# Patient Record
Sex: MALE | Race: WHITE | Employment: PART TIME | URBAN - METROPOLITAN AREA
[De-identification: names, ages, dates, MRNs, and addresses within clinical notes are randomized per-mention and may not be internally consistent; named-entity substitution may affect disease eponyms.]

---

## 2019-11-07 ENCOUNTER — HOSPITAL ENCOUNTER (INPATIENT)
Facility: CLINIC | Age: 29
LOS: 4 days | Discharge: HOME | End: 2019-11-11
Attending: EMERGENCY MEDICINE | Admitting: HOSPITALIST
Payer: COMMERCIAL

## 2019-11-07 DIAGNOSIS — F10.930 ALCOHOL WITHDRAWAL SYNDROME WITHOUT COMPLICATION (H): ICD-10-CM

## 2019-11-07 PROBLEM — F10.939 ALCOHOL WITHDRAWAL (H): Status: ACTIVE | Noted: 2019-11-07

## 2019-11-07 LAB
ALBUMIN SERPL-MCNC: 4.4 G/DL (ref 3.4–5)
ALP SERPL-CCNC: 51 U/L (ref 40–150)
ALT SERPL W P-5'-P-CCNC: 128 U/L (ref 0–70)
ANION GAP SERPL CALCULATED.3IONS-SCNC: 11 MMOL/L (ref 3–14)
AST SERPL W P-5'-P-CCNC: 122 U/L (ref 0–45)
BILIRUB SERPL-MCNC: 1.2 MG/DL (ref 0.2–1.3)
BUN SERPL-MCNC: 8 MG/DL (ref 7–30)
CALCIUM SERPL-MCNC: 9.1 MG/DL (ref 8.5–10.1)
CHLORIDE SERPL-SCNC: 99 MMOL/L (ref 94–109)
CO2 SERPL-SCNC: 26 MMOL/L (ref 20–32)
CREAT SERPL-MCNC: 0.95 MG/DL (ref 0.66–1.25)
ERYTHROCYTE [DISTWIDTH] IN BLOOD BY AUTOMATED COUNT: 11.9 % (ref 10–15)
ETHANOL SERPL-MCNC: 0.04 G/DL
GFR SERPL CREATININE-BSD FRML MDRD: >90 ML/MIN/{1.73_M2}
GLUCOSE BLDC GLUCOMTR-MCNC: 104 MG/DL (ref 70–99)
GLUCOSE BLDC GLUCOMTR-MCNC: 94 MG/DL (ref 70–99)
GLUCOSE SERPL-MCNC: 104 MG/DL (ref 70–99)
HCT VFR BLD AUTO: 47.8 % (ref 40–53)
HGB BLD-MCNC: 16.6 G/DL (ref 13.3–17.7)
INR PPP: 0.95 (ref 0.86–1.14)
INTERPRETATION ECG - MUSE: NORMAL
MAGNESIUM SERPL-MCNC: 1.7 MG/DL (ref 1.6–2.3)
MCH RBC QN AUTO: 32.8 PG (ref 26.5–33)
MCHC RBC AUTO-ENTMCNC: 34.7 G/DL (ref 31.5–36.5)
MCV RBC AUTO: 95 FL (ref 78–100)
PLATELET # BLD AUTO: 180 10E9/L (ref 150–450)
POTASSIUM SERPL-SCNC: 3.6 MMOL/L (ref 3.4–5.3)
PROT SERPL-MCNC: 7.4 G/DL (ref 6.8–8.8)
RBC # BLD AUTO: 5.06 10E12/L (ref 4.4–5.9)
SODIUM SERPL-SCNC: 136 MMOL/L (ref 133–144)
WBC # BLD AUTO: 8.6 10E9/L (ref 4–11)

## 2019-11-07 PROCEDURE — 80320 DRUG SCREEN QUANTALCOHOLS: CPT | Performed by: EMERGENCY MEDICINE

## 2019-11-07 PROCEDURE — HZ2ZZZZ DETOXIFICATION SERVICES FOR SUBSTANCE ABUSE TREATMENT: ICD-10-PCS | Performed by: INTERNAL MEDICINE

## 2019-11-07 PROCEDURE — 20000003 ZZH R&B ICU

## 2019-11-07 PROCEDURE — 25000128 H RX IP 250 OP 636: Performed by: HOSPITALIST

## 2019-11-07 PROCEDURE — 96375 TX/PRO/DX INJ NEW DRUG ADDON: CPT

## 2019-11-07 PROCEDURE — 96361 HYDRATE IV INFUSION ADD-ON: CPT

## 2019-11-07 PROCEDURE — 87640 STAPH A DNA AMP PROBE: CPT | Performed by: HOSPITALIST

## 2019-11-07 PROCEDURE — 25000132 ZZH RX MED GY IP 250 OP 250 PS 637: Performed by: HOSPITALIST

## 2019-11-07 PROCEDURE — 25800030 ZZH RX IP 258 OP 636: Performed by: EMERGENCY MEDICINE

## 2019-11-07 PROCEDURE — 96376 TX/PRO/DX INJ SAME DRUG ADON: CPT

## 2019-11-07 PROCEDURE — 25800030 ZZH RX IP 258 OP 636: Performed by: HOSPITALIST

## 2019-11-07 PROCEDURE — 93005 ELECTROCARDIOGRAM TRACING: CPT

## 2019-11-07 PROCEDURE — 87641 MR-STAPH DNA AMP PROBE: CPT | Performed by: HOSPITALIST

## 2019-11-07 PROCEDURE — 99291 CRITICAL CARE FIRST HOUR: CPT | Performed by: HOSPITALIST

## 2019-11-07 PROCEDURE — 85610 PROTHROMBIN TIME: CPT | Performed by: EMERGENCY MEDICINE

## 2019-11-07 PROCEDURE — 25000132 ZZH RX MED GY IP 250 OP 250 PS 637: Performed by: PHYSICIAN ASSISTANT

## 2019-11-07 PROCEDURE — 85027 COMPLETE CBC AUTOMATED: CPT | Performed by: EMERGENCY MEDICINE

## 2019-11-07 PROCEDURE — 96374 THER/PROPH/DIAG INJ IV PUSH: CPT

## 2019-11-07 PROCEDURE — 99285 EMERGENCY DEPT VISIT HI MDM: CPT | Mod: 25

## 2019-11-07 PROCEDURE — 80053 COMPREHEN METABOLIC PANEL: CPT | Performed by: EMERGENCY MEDICINE

## 2019-11-07 PROCEDURE — 83735 ASSAY OF MAGNESIUM: CPT | Performed by: EMERGENCY MEDICINE

## 2019-11-07 PROCEDURE — 25000128 H RX IP 250 OP 636: Performed by: EMERGENCY MEDICINE

## 2019-11-07 PROCEDURE — 00000146 ZZHCL STATISTIC GLUCOSE BY METER IP

## 2019-11-07 PROCEDURE — 25000128 H RX IP 250 OP 636: Performed by: PHYSICIAN ASSISTANT

## 2019-11-07 PROCEDURE — 25000125 ZZHC RX 250: Performed by: HOSPITALIST

## 2019-11-07 RX ORDER — GABAPENTIN 400 MG/1
800 CAPSULE ORAL EVERY 8 HOURS
Status: COMPLETED | OUTPATIENT
Start: 2019-11-08 | End: 2019-11-10

## 2019-11-07 RX ORDER — LANOLIN ALCOHOL/MO/W.PET/CERES
3 CREAM (GRAM) TOPICAL
Status: DISCONTINUED | OUTPATIENT
Start: 2019-11-07 | End: 2019-11-11 | Stop reason: HOSPADM

## 2019-11-07 RX ORDER — NALOXONE HYDROCHLORIDE 0.4 MG/ML
.1-.4 INJECTION, SOLUTION INTRAMUSCULAR; INTRAVENOUS; SUBCUTANEOUS
Status: DISCONTINUED | OUTPATIENT
Start: 2019-11-07 | End: 2019-11-11 | Stop reason: HOSPADM

## 2019-11-07 RX ORDER — POTASSIUM CHLORIDE 1.5 G/1.58G
20-40 POWDER, FOR SOLUTION ORAL
Status: DISCONTINUED | OUTPATIENT
Start: 2019-11-07 | End: 2019-11-11 | Stop reason: HOSPADM

## 2019-11-07 RX ORDER — FOLIC ACID 5 MG/ML
1 INJECTION, SOLUTION INTRAMUSCULAR; INTRAVENOUS; SUBCUTANEOUS ONCE
Status: COMPLETED | OUTPATIENT
Start: 2019-11-07 | End: 2019-11-07

## 2019-11-07 RX ORDER — LORAZEPAM 1 MG/1
1-2 TABLET ORAL EVERY 30 MIN PRN
Status: DISCONTINUED | OUTPATIENT
Start: 2019-11-07 | End: 2019-11-07

## 2019-11-07 RX ORDER — DIAZEPAM 10 MG/2ML
20 INJECTION, SOLUTION INTRAMUSCULAR; INTRAVENOUS ONCE
Status: COMPLETED | OUTPATIENT
Start: 2019-11-07 | End: 2019-11-07

## 2019-11-07 RX ORDER — OLANZAPINE 5 MG/1
5 TABLET, ORALLY DISINTEGRATING ORAL EVERY 6 HOURS PRN
Status: CANCELLED | OUTPATIENT
Start: 2019-11-07

## 2019-11-07 RX ORDER — AMOXICILLIN 250 MG
2 CAPSULE ORAL 2 TIMES DAILY PRN
Status: DISCONTINUED | OUTPATIENT
Start: 2019-11-07 | End: 2019-11-11 | Stop reason: HOSPADM

## 2019-11-07 RX ORDER — METOPROLOL TARTRATE 1 MG/ML
5 INJECTION, SOLUTION INTRAVENOUS EVERY 6 HOURS PRN
Status: DISCONTINUED | OUTPATIENT
Start: 2019-11-07 | End: 2019-11-11 | Stop reason: HOSPADM

## 2019-11-07 RX ORDER — GABAPENTIN 300 MG/1
600 CAPSULE ORAL EVERY 8 HOURS
Status: DISCONTINUED | OUTPATIENT
Start: 2019-11-11 | End: 2019-11-11 | Stop reason: HOSPADM

## 2019-11-07 RX ORDER — DEXTROSE MONOHYDRATE, SODIUM CHLORIDE, AND POTASSIUM CHLORIDE 50; 1.49; 9 G/1000ML; G/1000ML; G/1000ML
INJECTION, SOLUTION INTRAVENOUS CONTINUOUS
Status: DISCONTINUED | OUTPATIENT
Start: 2019-11-07 | End: 2019-11-07

## 2019-11-07 RX ORDER — QUETIAPINE FUMARATE 25 MG/1
25-50 TABLET, FILM COATED ORAL EVERY 6 HOURS PRN
Status: DISCONTINUED | OUTPATIENT
Start: 2019-11-07 | End: 2019-11-07

## 2019-11-07 RX ORDER — DIAZEPAM 10 MG/2ML
10 INJECTION, SOLUTION INTRAMUSCULAR; INTRAVENOUS ONCE
Status: COMPLETED | OUTPATIENT
Start: 2019-11-07 | End: 2019-11-07

## 2019-11-07 RX ORDER — PROCHLORPERAZINE MALEATE 10 MG
10 TABLET ORAL EVERY 6 HOURS PRN
Status: DISCONTINUED | OUTPATIENT
Start: 2019-11-07 | End: 2019-11-11 | Stop reason: HOSPADM

## 2019-11-07 RX ORDER — POTASSIUM CHLORIDE 29.8 MG/ML
20 INJECTION INTRAVENOUS
Status: DISCONTINUED | OUTPATIENT
Start: 2019-11-07 | End: 2019-11-07

## 2019-11-07 RX ORDER — FOLIC ACID 1 MG/1
1 TABLET ORAL DAILY
Status: DISCONTINUED | OUTPATIENT
Start: 2019-11-07 | End: 2019-11-07

## 2019-11-07 RX ORDER — LIDOCAINE 40 MG/G
CREAM TOPICAL
Status: DISCONTINUED | OUTPATIENT
Start: 2019-11-07 | End: 2019-11-11 | Stop reason: HOSPADM

## 2019-11-07 RX ORDER — POTASSIUM CHLORIDE 7.45 MG/ML
10 INJECTION INTRAVENOUS
Status: DISCONTINUED | OUTPATIENT
Start: 2019-11-07 | End: 2019-11-11 | Stop reason: HOSPADM

## 2019-11-07 RX ORDER — MULTIPLE VITAMINS W/ MINERALS TAB 9MG-400MCG
1 TAB ORAL DAILY
Status: DISCONTINUED | OUTPATIENT
Start: 2019-11-07 | End: 2019-11-11 | Stop reason: HOSPADM

## 2019-11-07 RX ORDER — FOLIC ACID 1 MG/1
1 TABLET ORAL DAILY
Status: DISCONTINUED | OUTPATIENT
Start: 2019-11-10 | End: 2019-11-11 | Stop reason: HOSPADM

## 2019-11-07 RX ORDER — LANOLIN ALCOHOL/MO/W.PET/CERES
100 CREAM (GRAM) TOPICAL DAILY
Status: DISCONTINUED | OUTPATIENT
Start: 2019-11-07 | End: 2019-11-08

## 2019-11-07 RX ORDER — GABAPENTIN 600 MG/1
1200 TABLET ORAL ONCE
Status: COMPLETED | OUTPATIENT
Start: 2019-11-07 | End: 2019-11-07

## 2019-11-07 RX ORDER — GABAPENTIN 100 MG/1
100 CAPSULE ORAL EVERY 8 HOURS
Status: DISCONTINUED | OUTPATIENT
Start: 2019-11-15 | End: 2019-11-11 | Stop reason: HOSPADM

## 2019-11-07 RX ORDER — AMOXICILLIN 250 MG
1 CAPSULE ORAL 2 TIMES DAILY PRN
Status: DISCONTINUED | OUTPATIENT
Start: 2019-11-07 | End: 2019-11-11 | Stop reason: HOSPADM

## 2019-11-07 RX ORDER — LANOLIN ALCOHOL/MO/W.PET/CERES
100 CREAM (GRAM) TOPICAL 3 TIMES DAILY
Status: CANCELLED | OUTPATIENT
Start: 2019-11-09 | End: 2019-11-14

## 2019-11-07 RX ORDER — SODIUM CHLORIDE, SODIUM LACTATE, POTASSIUM CHLORIDE, CALCIUM CHLORIDE 600; 310; 30; 20 MG/100ML; MG/100ML; MG/100ML; MG/100ML
INJECTION, SOLUTION INTRAVENOUS CONTINUOUS
Status: DISCONTINUED | OUTPATIENT
Start: 2019-11-07 | End: 2019-11-09

## 2019-11-07 RX ORDER — LANOLIN ALCOHOL/MO/W.PET/CERES
100 CREAM (GRAM) TOPICAL DAILY
Status: DISCONTINUED | OUTPATIENT
Start: 2019-11-14 | End: 2019-11-08

## 2019-11-07 RX ORDER — CLONIDINE HYDROCHLORIDE 0.1 MG/1
0.1 TABLET ORAL EVERY 8 HOURS
Status: DISCONTINUED | OUTPATIENT
Start: 2019-11-07 | End: 2019-11-07

## 2019-11-07 RX ORDER — FOLIC ACID 5 MG/ML
1 INJECTION, SOLUTION INTRAMUSCULAR; INTRAVENOUS; SUBCUTANEOUS DAILY
Status: CANCELLED | OUTPATIENT
Start: 2019-11-08 | End: 2019-11-10

## 2019-11-07 RX ORDER — FAMOTIDINE 20 MG/1
20 TABLET, FILM COATED ORAL 2 TIMES DAILY
Status: CANCELLED | OUTPATIENT
Start: 2019-11-07

## 2019-11-07 RX ORDER — ONDANSETRON 4 MG/1
8 TABLET, ORALLY DISINTEGRATING ORAL EVERY 8 HOURS PRN
Status: DISCONTINUED | OUTPATIENT
Start: 2019-11-07 | End: 2019-11-11 | Stop reason: HOSPADM

## 2019-11-07 RX ORDER — DIAZEPAM 5 MG
10 TABLET ORAL EVERY 30 MIN PRN
Status: CANCELLED | OUTPATIENT
Start: 2019-11-07

## 2019-11-07 RX ORDER — LORAZEPAM 2 MG/ML
1-2 INJECTION INTRAMUSCULAR EVERY 30 MIN PRN
Status: DISCONTINUED | OUTPATIENT
Start: 2019-11-07 | End: 2019-11-07

## 2019-11-07 RX ORDER — PROCHLORPERAZINE 25 MG
25 SUPPOSITORY, RECTAL RECTAL EVERY 12 HOURS PRN
Status: DISCONTINUED | OUTPATIENT
Start: 2019-11-07 | End: 2019-11-11 | Stop reason: HOSPADM

## 2019-11-07 RX ORDER — METOPROLOL TARTRATE 1 MG/ML
5 INJECTION, SOLUTION INTRAVENOUS EVERY 6 HOURS PRN
Status: CANCELLED | OUTPATIENT
Start: 2019-11-07

## 2019-11-07 RX ORDER — PROPRANOLOL HYDROCHLORIDE 10 MG/1
10-20 TABLET ORAL 2 TIMES DAILY PRN
Status: CANCELLED | OUTPATIENT
Start: 2019-11-07

## 2019-11-07 RX ORDER — GABAPENTIN 300 MG/1
300 CAPSULE ORAL EVERY 8 HOURS
Status: DISCONTINUED | OUTPATIENT
Start: 2019-11-13 | End: 2019-11-11 | Stop reason: HOSPADM

## 2019-11-07 RX ORDER — BISACODYL 10 MG
10 SUPPOSITORY, RECTAL RECTAL DAILY PRN
Status: DISCONTINUED | OUTPATIENT
Start: 2019-11-07 | End: 2019-11-11 | Stop reason: HOSPADM

## 2019-11-07 RX ORDER — HALOPERIDOL 5 MG/ML
2.5-5 INJECTION INTRAMUSCULAR EVERY 4 HOURS PRN
Status: CANCELLED | OUTPATIENT
Start: 2019-11-07

## 2019-11-07 RX ORDER — DIAZEPAM 10 MG/2ML
5 INJECTION, SOLUTION INTRAMUSCULAR; INTRAVENOUS ONCE
Status: COMPLETED | OUTPATIENT
Start: 2019-11-07 | End: 2019-11-07

## 2019-11-07 RX ORDER — IBUPROFEN 600 MG/1
600 TABLET, FILM COATED ORAL EVERY 6 HOURS PRN
Status: DISCONTINUED | OUTPATIENT
Start: 2019-11-07 | End: 2019-11-09

## 2019-11-07 RX ORDER — DIAZEPAM 10 MG/2ML
INJECTION, SOLUTION INTRAMUSCULAR; INTRAVENOUS
Status: DISCONTINUED
Start: 2019-11-07 | End: 2019-11-07 | Stop reason: HOSPADM

## 2019-11-07 RX ORDER — DIAZEPAM 10 MG/2ML
20 INJECTION, SOLUTION INTRAMUSCULAR; INTRAVENOUS ONCE
Status: DISCONTINUED | OUTPATIENT
Start: 2019-11-07 | End: 2019-11-07

## 2019-11-07 RX ORDER — ONDANSETRON 2 MG/ML
8 INJECTION INTRAMUSCULAR; INTRAVENOUS ONCE
Status: COMPLETED | OUTPATIENT
Start: 2019-11-07 | End: 2019-11-07

## 2019-11-07 RX ORDER — POTASSIUM CHLORIDE 1500 MG/1
20-40 TABLET, EXTENDED RELEASE ORAL
Status: DISCONTINUED | OUTPATIENT
Start: 2019-11-07 | End: 2019-11-11 | Stop reason: HOSPADM

## 2019-11-07 RX ORDER — DIAZEPAM 10 MG/2ML
5-10 INJECTION, SOLUTION INTRAMUSCULAR; INTRAVENOUS EVERY 30 MIN PRN
Status: CANCELLED | OUTPATIENT
Start: 2019-11-07

## 2019-11-07 RX ORDER — ONDANSETRON 2 MG/ML
8 INJECTION INTRAMUSCULAR; INTRAVENOUS EVERY 8 HOURS PRN
Status: DISCONTINUED | OUTPATIENT
Start: 2019-11-07 | End: 2019-11-11 | Stop reason: HOSPADM

## 2019-11-07 RX ORDER — MAGNESIUM SULFATE HEPTAHYDRATE 40 MG/ML
4 INJECTION, SOLUTION INTRAVENOUS EVERY 4 HOURS PRN
Status: DISCONTINUED | OUTPATIENT
Start: 2019-11-07 | End: 2019-11-11 | Stop reason: HOSPADM

## 2019-11-07 RX ORDER — MULTIPLE VITAMINS W/ MINERALS TAB 9MG-400MCG
1 TAB ORAL DAILY
Status: DISCONTINUED | OUTPATIENT
Start: 2019-11-07 | End: 2019-11-07

## 2019-11-07 RX ORDER — POTASSIUM CL/LIDO/0.9 % NACL 10MEQ/0.1L
10 INTRAVENOUS SOLUTION, PIGGYBACK (ML) INTRAVENOUS
Status: DISCONTINUED | OUTPATIENT
Start: 2019-11-07 | End: 2019-11-11 | Stop reason: HOSPADM

## 2019-11-07 RX ADMIN — ENOXAPARIN SODIUM 40 MG: 40 INJECTION SUBCUTANEOUS at 17:00

## 2019-11-07 RX ADMIN — SODIUM CHLORIDE, POTASSIUM CHLORIDE, SODIUM LACTATE AND CALCIUM CHLORIDE: 600; 310; 30; 20 INJECTION, SOLUTION INTRAVENOUS at 16:53

## 2019-11-07 RX ADMIN — MELATONIN TAB 3 MG 3 MG: 3 TAB at 21:43

## 2019-11-07 RX ADMIN — DIAZEPAM 20 MG: 5 INJECTION, SOLUTION INTRAMUSCULAR; INTRAVENOUS at 14:55

## 2019-11-07 RX ADMIN — GABAPENTIN 800 MG: 400 CAPSULE ORAL at 23:53

## 2019-11-07 RX ADMIN — DIAZEPAM 10 MG: 5 INJECTION, SOLUTION INTRAMUSCULAR; INTRAVENOUS at 12:25

## 2019-11-07 RX ADMIN — DIAZEPAM 10 MG: 5 INJECTION, SOLUTION INTRAMUSCULAR; INTRAVENOUS at 14:20

## 2019-11-07 RX ADMIN — SODIUM CHLORIDE, POTASSIUM CHLORIDE, SODIUM LACTATE AND CALCIUM CHLORIDE 1000 ML: 600; 310; 30; 20 INJECTION, SOLUTION INTRAVENOUS at 12:06

## 2019-11-07 RX ADMIN — THIAMINE HYDROCHLORIDE 200 MG: 100 INJECTION, SOLUTION INTRAMUSCULAR; INTRAVENOUS at 18:02

## 2019-11-07 RX ADMIN — DEXMEDETOMIDINE 0.2 MCG/KG/HR: 100 INJECTION, SOLUTION, CONCENTRATE INTRAVENOUS at 16:48

## 2019-11-07 RX ADMIN — DIAZEPAM 10 MG: 5 INJECTION, SOLUTION INTRAMUSCULAR; INTRAVENOUS at 13:03

## 2019-11-07 RX ADMIN — FOLIC ACID 1 MG: 5 INJECTION, SOLUTION INTRAMUSCULAR; INTRAVENOUS; SUBCUTANEOUS at 18:02

## 2019-11-07 RX ADMIN — POTASSIUM CHLORIDE 20 MEQ: 1500 TABLET, EXTENDED RELEASE ORAL at 17:42

## 2019-11-07 RX ADMIN — THIAMINE HYDROCHLORIDE 200 MG: 100 INJECTION, SOLUTION INTRAMUSCULAR; INTRAVENOUS at 21:45

## 2019-11-07 RX ADMIN — MULTIPLE VITAMINS W/ MINERALS TAB 1 TABLET: TAB at 16:58

## 2019-11-07 RX ADMIN — ONDANSETRON HYDROCHLORIDE 8 MG: 2 INJECTION, SOLUTION INTRAMUSCULAR; INTRAVENOUS at 12:20

## 2019-11-07 RX ADMIN — DIAZEPAM 5 MG: 5 INJECTION, SOLUTION INTRAMUSCULAR; INTRAVENOUS at 12:06

## 2019-11-07 RX ADMIN — GABAPENTIN 1200 MG: 600 TABLET, FILM COATED ORAL at 16:58

## 2019-11-07 RX ADMIN — Medication 100 MG: at 16:58

## 2019-11-07 ASSESSMENT — ENCOUNTER SYMPTOMS
DIARRHEA: 0
FEVER: 0
HALLUCINATIONS: 0
HEADACHES: 0
NAUSEA: 1
VOMITING: 0
CONFUSION: 0
SHORTNESS OF BREATH: 0
NERVOUS/ANXIOUS: 1
TREMORS: 1
ABDOMINAL PAIN: 0
SEIZURES: 1
BLOOD IN STOOL: 0

## 2019-11-07 ASSESSMENT — ACTIVITIES OF DAILY LIVING (ADL)
FALL_HISTORY_WITHIN_LAST_SIX_MONTHS: NO
RETIRED_COMMUNICATION: 0-->UNDERSTANDS/COMMUNICATES WITHOUT DIFFICULTY
ADLS_ACUITY_SCORE: 11
DRESS: 0-->INDEPENDENT
COGNITION: 0 - NO COGNITION ISSUES REPORTED
RETIRED_EATING: 0-->INDEPENDENT
AMBULATION: 0-->INDEPENDENT
TOILETING: 0-->INDEPENDENT
BATHING: 0-->INDEPENDENT
SWALLOWING: 0-->SWALLOWS FOODS/LIQUIDS WITHOUT DIFFICULTY
TRANSFERRING: 0-->INDEPENDENT

## 2019-11-07 ASSESSMENT — MIFFLIN-ST. JEOR: SCORE: 2019.75

## 2019-11-07 NOTE — PHARMACY-ADMISSION MEDICATION HISTORY
Admission medication history interview status for this patient is complete. See [facility] admission navigator for allergy information, prior to admission medications and immunization status.     Medication history interview source(s):Patient  Medication history resources (including written lists, pill bottles, clinic record):None  [facility]    Changes made to PTA medication list:  Added: none  Deleted: none  Changed: none    Actions taken by pharmacist (provider contacted, etc):None     Additional medication history information:None    Medication reconciliation/reorder completed by provider prior to medication history?  No     Do you take OTC medications (eg tylenol, ibuprofen, fish oil, eye/ear drops, etc)? No     For patients on insulin therapy: No      Prior to Admission medications    Not on File

## 2019-11-07 NOTE — ED NOTES
[facility]  ED Nurse Handoff Report    Lanre Garcia is a 29 year old male   ED Chief complaint: No chief complaint on file.  . ED Diagnosis:   Final diagnoses:   Alcohol withdrawal syndrome without complication (H)     Allergies: No Known Allergies    Code Status: Full Code  Activity level - Baseline/Home:  Independent. Activity Level - Current:   Assist X 1. Lift room needed: No. Bariatric: No   Needed: No   Isolation: No. Infection: Not Applicable.     Vital Signs:   Vitals:    11/07/19 1150 11/07/19 1200   BP: (!) 186/113 (!) 151/114   Pulse: 103 97   Resp: 24 17   Temp: 98  F (36.7  C)    TempSrc: Oral    SpO2: 100% 99%       Cardiac Rhythm:  ,      Pain level:    Patient confused: No. Patient Falls Risk: Yes.   Elimination Status: has not voided yet   Patient Report / Focused Assessment:    Tests Performed / Abnormal Results:    No orders to display     Labs Ordered and Resulted from Time of ED Arrival Up to the Time of Departure from the ED   COMPREHENSIVE METABOLIC PANEL - Abnormal; Notable for the following components:       Result Value    Glucose 104 (*)      (*)      (*)     All other components within normal limits   ALCOHOL ETHYL - Abnormal; Notable for the following components:    Ethanol g/dL 0.04 (*)     All other components within normal limits   GLUCOSE BY METER - Abnormal; Notable for the following components:    Glucose 104 (*)     All other components within normal limits   CBC WITH PLATELETS   INR   MAGNESIUM   PERIPHERAL IV CATHETER   CARDIAC CONTINUOUS MONITORING   Treatments provided: PIV, LABS, BLOOD, EKG, VALIUM, ZOFRAN, BOX LUNCH, CARDIAC, BP AND PULSE MONITORING, CIWA.  Family Comments: dad has left  OBS brochure/video discussed/provided to patient:  No  ED Medications:   Medications   diazepam (VALIUM) 5 MG/ML injection (  Not Given 11/7/19 1227)   lactated ringers BOLUS 1,000 mL (1,000 mLs Intravenous New Bag 11/7/19 1206)   diazepam  (VALIUM) injection 5 mg (5 mg Intravenous Given 11/7/19 1206)   ondansetron (ZOFRAN) injection 8 mg (8 mg Intravenous Given 11/7/19 1220)   diazepam (VALIUM) injection 10 mg (10 mg Intravenous Given 11/7/19 1225)   diazepam (VALIUM) injection 10 mg (10 mg Intravenous Given 11/7/19 1303)     Drips infusing:  Yes  For the majority of the shift, the patient's behavior Green. Interventions performed were none.     Severe Sepsis OR Septic Shock Diagnosis Present: No      ED Nurse Name/Phone Number: Le Warren RN, [phone]  1:39 PM

## 2019-11-07 NOTE — H&P
"[facility]    History and Physical  Hospitalist       Date of Admission:  11/7/2019    Assessment & Plan   Lanre Garcia is a 29 year old male history of anxiety/depression and history of complicated alcohol withdrawal in the past who presents with alcohol withdrawal symptoms.    #Alcohol withdrawal with history of withdrawal seizures: Patient also reportedly with an unwitnessed seizure earlier this a.m.  Lasted only seconds and described as clenching his jaw in his entire body \"went stiff.\"  Patient does appear tremulous and anxious in the ED.  He was given a total of 30 mg of diazepam with some improvement in symptoms.  His last drink was approximately 2300 on 11/6.  He typically drinks about 600 cc of hard liquor daily.  He is interested in cessation.  -Admit to ICU for close monitoring in the setting of possible complicated withdrawal.  -Alcohol withdrawal ICU protocol ordered with gabapentin and Precedex drip.  He is already received a total of 30 mg of IV Diazepam in the ED. Given signficant tremulousness despite administration of diazepam, will start precedex gtt along with gabapentin therapy.  Discussed with on call intensivist.    -IV fluids  -IV vitamins followed by daily vitamin therapy  -Social work consult once patient clinically improved    DVT Prophylaxis: Enoxaparin (Lovenox) SQ  Code Status: Full Code  Expected discharge: 2 - 3 days, recommended to prior living arrangement once and out of the window for complicated withdrawal.  Patient may transfer to the floor pending how he does overnight and his need for Precedex.    Greater than 40 minutes of critical care time spent taking care of this patient.  Patient was discussed with on-call intensivist.    Khai Luciano MD    Primary Care Physician   [facility]    Chief Complaint   Alcohol withdrawal    History is obtained from the patient, his chart, emergency medicine physician.    History of Present Illness   Lanre PEREZ" Jose is a 29 year old male history of anxiety/depression and history of complicated alcohol withdrawal in the past who presents with alcohol withdrawal symptoms.    Patient states he has attempted to stop drinking multiple times over the last several years.  Each time he typically has withdrawal symptoms.  Usually 24 hours after stopping drinking he will get tremulous and has had seizures in the past.  He states some of the seizures have been witnessed.  He has not ever been hospitalized for alcohol withdrawal in the past.  He is also not been under outpatient management for alcohol withdrawal in the past.  He states his last drink was approximately 12 hours ago.  He typically drinks about 600 cc of hard liquor daily.  He states that starting this morning he started to have some auditory hallucinations where he felt like he was hearing some voices.  He also notes that he was getting increasingly tremulous.  He also states that he had a seizure that lasted a few seconds.  He states that he clenched up in his mouth was clenched tight and he could not open it.  He did not bite his tongue and he did not lose bowel or bladder function.    In the ED, patient noted to be tachycardic, hypertensive and tremulous.  Labs notable for normal BMP and mild elevation of liver enzymes.  CBC was within normal limits.  INR was within normal limits.  Ethanol level was 0.04.  Patient was given a total of 30 mg of diazepam in the ED.  This did help with his symptoms but he did still continue to feel anxious.  As such ICU admission was recommended for Precedex drip.    Past Medical History    I have reviewed this patient's medical history and updated it with pertinent information if needed.   Past Medical History:   Diagnosis Date     ETOHism (H)      Seizures (H)        Past Surgical History   I have reviewed this patient's surgical history and updated it with pertinent information if needed.  No past surgical history on file.    Prior  to Admission Medications    None  Allergies   No known Allergies    Social History   Patient lives independently.  He is a non-smoker.  Drinks as above in HPI.    Family History   Positive family history of alcohol use on mother side.    Review of Systems   The 10 point Review of Systems is negative other than noted in the HPI or here.     Physical Exam   Temp: 98  F (36.7  C) Temp src: Oral BP: (!) 141/104 Pulse: 103 Heart Rate: 112 Resp: 11 SpO2: 99 % O2 Device: None (Room air)    Vital Signs with Ranges  Temp:  [98  F (36.7  C)] 98  F (36.7  C)  Pulse:  [] 103  Heart Rate:  [] 112  Resp:  [11-26] 11  BP: (141-186)/(102-119) 141/104  SpO2:  [95 %-100 %] 99 %  0 lbs 0 oz    Constitutional: Appears anxious, sitting up in bed.  tremulous  HEENT: Mucous membranes moist without evidence of tongue lesion.  normocephalic  Respiratory: Clear bilaterally, normal work of breathing  Cardiovascular: Tacky without murmur  GI: Bowel sounds present, nontender nondistended  Lymph/Hematologic: No Bruising noted  Skin: No rashes noted  Musculoskeletal: Nl tone  Neurologic: Cranial nerves II through XII intact, patient answers questions appropriately.  ANO x3.  Moves all extremities.  Positive tremor in the upper extremities bilaterally noted.  Psychiatric: Anxious    Data   Data reviewed today:  I personally reviewed     EKG showing sinus rhythm without acute ST segment elevations or depressions.    Recent Labs   Lab 11/07/19  1209   WBC 8.6   HGB 16.6   MCV 95      INR 0.95      POTASSIUM 3.6   CHLORIDE 99   CO2 26   BUN 8   CR 0.95   ANIONGAP 11   MAYNOR 9.1   *   ALBUMIN 4.4   PROTTOTAL 7.4   BILITOTAL 1.2   ALKPHOS 51   *   *       No results found for this or any previous visit (from the past 24 hour(s)).

## 2019-11-07 NOTE — ED TRIAGE NOTES
"ABCs intact. Pt hx ETOH withdrawal with seizures. Pt states he drinks about 600ml 100 proof schnapps every day. Last drink about twelve hrs ago. Pt states he \"had a seizure this morning, teeth chattering.\" Pt states he did not lose consciousness or incontinence.   "

## 2019-11-07 NOTE — ED PROVIDER NOTES
History     Chief Complaint:  Alcohol withdrawal     HPI  Lanre Garcia is a 29 year old male who presents to the emergency department today for evaluation of alcohol withdrawal. The patient reports that this morning, he was asleep and was woken up for a brief moment before seeing flashing lights and chattering teeth and feeling tensed up. He believes this lasted a total of 10 seconds and doesn't think he lost consciousness. No bowel or bladder incontinence. He believes it was a seizure and after the episode, he felt very nauseous. He is still nauseous here and has tremors. The patient reports 4-5 episodes of alcohol withdrawal. He last had 3 seizures in a 5 minute period during his last episode of withdrawal, though states he was at home and wasn't evaluated in the emergency department at that time. His last drink was 12 hours ago, though he states it typically takes over 24 hours for him to go into withdrawal. He typically drinks a total of 600 mL daily of 100 proof schnapps. No recent head trauma or falls. No fevers, black or bloody stools, vomiting, diarrhea, abdominal pain, headache, chest pain, shortness of breath, hallucinations, confusion, or any other symptoms. No suicidal or homicidal ideation.    Allergies:  No known drug allergies    Medications:    The patient is not currently taking any prescribed medications.    Past Medical History:    Alcoholism   Seizures  Major depressive disorder    Past Surgical History:    History reviewed. No pertinent surgical history.    Family History:    History reviewed. No pertinent family history.     Social History:  Smoking status: Never  Alcohol use: Yes  Marital Status: Single     Review of Systems   Constitutional: Negative for fever.   Respiratory: Negative for shortness of breath.    Cardiovascular: Negative for chest pain.   Gastrointestinal: Positive for nausea. Negative for abdominal pain, blood in stool, diarrhea and vomiting.   Neurological: Positive  for tremors and seizures. Negative for syncope and headaches.   Psychiatric/Behavioral: Negative for confusion, hallucinations, self-injury and suicidal ideas. The patient is nervous/anxious.    All other systems reviewed and are negative.      Physical Exam     Patient Vitals for the past 24 hrs:   BP Temp Temp src Pulse Heart Rate Resp SpO2   11/07/19 1200 (!) 151/114 -- -- 97 96 17 99 %   11/07/19 1150 (!) 186/113 98  F (36.7  C) Oral 103 103 24 100 %     Physical Exam    General:   Pleasant, mildly ill appearing male  HEENT:    Oropharynx is moist, without lesions or trismus.     No tongue laceration  Eyes:    Conjunctiva normal  Neck:    Supple, no meningismus.     CV:     Tachycardic, regular hythm.      No murmurs, rubs or gallops.       2+ radial pulses bilateral.       No lower extremity edema.  PULM:    Clear to auscultation bilateral.       No respiratory distress.      Good air exchange.  ABD:    Soft, non-tender, non-distended.       No pulsatile masses.       No rebound, guarding or rigidity.  MSK:     No gross deformity to all four extremities.   LYMPH:   No cervical lymphadenopathy.  NEURO:   Alert & O x 3.     CN II-XII intact, speech is clear with no aphasia.       Strength is 5/5 in all 4 extremities.  Sensation is intact.       Normal muscular tone     Fine resting tremor to the upper extremities  Skin:    Diaphoretic  Psych:    Mildly anxious    Emergency Department Course     ECG:  ECG taken at 1152, ECG read at 1157  Normal sinus rhythm. Minimal voltage criteria for LVH, may be normal variant.  Borderline ECG  Rate 92 bpm. MD interval 154 ms. QRS duration 96 ms. QT/QTc 356/440 ms. P-R-T axes 38 15 42.    Laboratory:  Laboratory findings were communicated with the patient who voiced understanding of the findings.    CBC: WBC 8.6, HGB 16.6,   CMP: Glucose 104 (H) o/w WNL (Creatinine 0.95)  INR: 0.95  Alcohol ethyl: 0.04 (H)  Magnesium: 1.7  1148 - Glucose: 104 (H)    Interventions:  1206:  Lactated ringers 1,000mL IV Bolus  1206: Valium 5mg IV  1220: Zofran 8mg IV   1225: Valium 10mg IV  1303: Valium 10mg IV  1420: Valium 10mg IV  1455: Valium 20mg IV    Emergency Department Course:  Past medical records, nursing notes, and vitals reviewed.  1145: I performed an exam of the patient and obtained history, as documented above. GCS 15.    IV inserted and blood drawn.    1220: I rechecked the patient. Heart rate is 115. Tremor is still present.    1255: Resting tremor much improved although present. Blood pressure 140/100.    Findings and plan explained to the Patient who consents to admission.     1307: Discussed the patient with Berkley Dior PA-C, for Dr. Schumacher, who will admit the patient to a med tele bed for further monitoring, evaluation, and treatment.     1414: I rechecked the patient. He is very tachycardic at 125 and tremor has significantly increased.    1440: I rechecked the patient. Patient states he is feeling better but heart rate is 130 now and the tremor is persisting. Will upgrade him to an ICU bed rather than med tele.    1506: I rechecked the patient.     1508: I discussed the case with Berkley Dior again regarding the patient and my concerns for an ICU bed. He will be admitted to an ICU bed now.    Impression & Plan      Medical Decision Making:  Lanre Garcia is a 29 year old male presented to the ED with findings concerning for alcohol withdrawal.  He was concerned that he may have had a seizure but his historical features do not support this diagnosis.  He has obvious alcohol withdrawal symptoms with tremor, tachycardia and hypertension.  Basic laboratory studies are unremarkable outside alcohol induced hepatitis.  Patient had improvement with multiple rounds of benzodiazepines.  Patient did have significant recurrence of symptoms after 25 mg of Valium requiring an additional 30 mg.  Due to his significant benzodiazepine requirement, patient will be transferred to the  intensive care unit.  Recheck prior to transfer to the ICU, tremor is minimal and heart rate is 110.  Blood pressure is 140 systolic.  He is mentating well and does not require any respiratory support.  No indication for further emergent intervention prior to his ICU transfer.    Total critical care time: 30 minutes    Diagnosis:    ICD-10-CM   1. Alcohol withdrawal syndrome without complication (H) F10.230       Disposition:   Admitted.      Scribe Disclosure:  I, Gloria Terry, am serving as a scribe at 11:45 AM on 11/7/2019 to document services personally performed by Elia Chamorro MD based on my observations and the provider's statements to me.    [facility] EMERGENCY DEPARTMENT       Elia Chamorro MD  11/07/19 3950

## 2019-11-08 LAB
ANION GAP SERPL CALCULATED.3IONS-SCNC: 5 MMOL/L (ref 3–14)
BUN SERPL-MCNC: 9 MG/DL (ref 7–30)
CALCIUM SERPL-MCNC: 9.2 MG/DL (ref 8.5–10.1)
CHLORIDE SERPL-SCNC: 106 MMOL/L (ref 94–109)
CO2 SERPL-SCNC: 28 MMOL/L (ref 20–32)
CREAT SERPL-MCNC: 1.04 MG/DL (ref 0.66–1.25)
GFR SERPL CREATININE-BSD FRML MDRD: >90 ML/MIN/{1.73_M2}
GLUCOSE BLDC GLUCOMTR-MCNC: 82 MG/DL (ref 70–99)
GLUCOSE BLDC GLUCOMTR-MCNC: 87 MG/DL (ref 70–99)
GLUCOSE BLDC GLUCOMTR-MCNC: 97 MG/DL (ref 70–99)
GLUCOSE BLDC GLUCOMTR-MCNC: 97 MG/DL (ref 70–99)
GLUCOSE BLDC GLUCOMTR-MCNC: 98 MG/DL (ref 70–99)
GLUCOSE SERPL-MCNC: 97 MG/DL (ref 70–99)
LACTATE BLD-SCNC: 0.7 MMOL/L (ref 0.7–2)
MRSA DNA SPEC QL NAA+PROBE: NEGATIVE
POTASSIUM SERPL-SCNC: 4.1 MMOL/L (ref 3.4–5.3)
SODIUM SERPL-SCNC: 139 MMOL/L (ref 133–144)
SPECIMEN SOURCE: NORMAL

## 2019-11-08 PROCEDURE — 99233 SBSQ HOSP IP/OBS HIGH 50: CPT | Performed by: HOSPITALIST

## 2019-11-08 PROCEDURE — 25000125 ZZHC RX 250: Performed by: HOSPITALIST

## 2019-11-08 PROCEDURE — 36415 COLL VENOUS BLD VENIPUNCTURE: CPT | Performed by: HOSPITALIST

## 2019-11-08 PROCEDURE — 80048 BASIC METABOLIC PNL TOTAL CA: CPT | Performed by: PHYSICIAN ASSISTANT

## 2019-11-08 PROCEDURE — 25000128 H RX IP 250 OP 636: Performed by: PHYSICIAN ASSISTANT

## 2019-11-08 PROCEDURE — 36415 COLL VENOUS BLD VENIPUNCTURE: CPT | Performed by: PHYSICIAN ASSISTANT

## 2019-11-08 PROCEDURE — 90686 IIV4 VACC NO PRSV 0.5 ML IM: CPT | Performed by: HOSPITALIST

## 2019-11-08 PROCEDURE — 83605 ASSAY OF LACTIC ACID: CPT | Performed by: HOSPITALIST

## 2019-11-08 PROCEDURE — 25000128 H RX IP 250 OP 636: Performed by: HOSPITALIST

## 2019-11-08 PROCEDURE — 25000132 ZZH RX MED GY IP 250 OP 250 PS 637: Performed by: INTERNAL MEDICINE

## 2019-11-08 PROCEDURE — 00000146 ZZHCL STATISTIC GLUCOSE BY METER IP

## 2019-11-08 PROCEDURE — 25000132 ZZH RX MED GY IP 250 OP 250 PS 637: Performed by: HOSPITALIST

## 2019-11-08 PROCEDURE — 25800030 ZZH RX IP 258 OP 636: Performed by: HOSPITALIST

## 2019-11-08 PROCEDURE — 25000132 ZZH RX MED GY IP 250 OP 250 PS 637: Performed by: PHYSICIAN ASSISTANT

## 2019-11-08 PROCEDURE — 12000000 ZZH R&B MED SURG/OB

## 2019-11-08 RX ORDER — CLONIDINE HYDROCHLORIDE 0.1 MG/1
0.1 TABLET ORAL 2 TIMES DAILY
Status: DISCONTINUED | OUTPATIENT
Start: 2019-11-08 | End: 2019-11-11

## 2019-11-08 RX ORDER — FAMOTIDINE 40 MG/5ML
20 POWDER, FOR SUSPENSION ORAL DAILY
Status: DISCONTINUED | OUTPATIENT
Start: 2019-11-08 | End: 2019-11-11 | Stop reason: HOSPADM

## 2019-11-08 RX ORDER — LANOLIN ALCOHOL/MO/W.PET/CERES
100 CREAM (GRAM) TOPICAL DAILY
Status: DISCONTINUED | OUTPATIENT
Start: 2019-11-09 | End: 2019-11-11 | Stop reason: HOSPADM

## 2019-11-08 RX ORDER — LORAZEPAM 1 MG/1
1-2 TABLET ORAL EVERY 30 MIN PRN
Status: DISCONTINUED | OUTPATIENT
Start: 2019-11-08 | End: 2019-11-11 | Stop reason: HOSPADM

## 2019-11-08 RX ORDER — LORAZEPAM 2 MG/ML
1-2 INJECTION INTRAMUSCULAR EVERY 30 MIN PRN
Status: DISCONTINUED | OUTPATIENT
Start: 2019-11-08 | End: 2019-11-11 | Stop reason: HOSPADM

## 2019-11-08 RX ADMIN — MULTIPLE VITAMINS W/ MINERALS TAB 1 TABLET: TAB at 08:17

## 2019-11-08 RX ADMIN — Medication 100 MG: at 08:17

## 2019-11-08 RX ADMIN — GABAPENTIN 800 MG: 400 CAPSULE ORAL at 08:16

## 2019-11-08 RX ADMIN — ENOXAPARIN SODIUM 40 MG: 40 INJECTION SUBCUTANEOUS at 16:40

## 2019-11-08 RX ADMIN — CLONIDINE HYDROCHLORIDE 0.1 MG: 0.1 TABLET ORAL at 22:45

## 2019-11-08 RX ADMIN — SODIUM CHLORIDE, POTASSIUM CHLORIDE, SODIUM LACTATE AND CALCIUM CHLORIDE: 600; 310; 30; 20 INJECTION, SOLUTION INTRAVENOUS at 11:50

## 2019-11-08 RX ADMIN — GABAPENTIN 800 MG: 400 CAPSULE ORAL at 16:39

## 2019-11-08 RX ADMIN — FAMOTIDINE 20 MG: 40 POWDER, FOR SUSPENSION ORAL at 16:39

## 2019-11-08 RX ADMIN — INFLUENZA A VIRUS A/BRISBANE/02/2018 IVR-190 (H1N1) ANTIGEN (FORMALDEHYDE INACTIVATED), INFLUENZA A VIRUS A/KANSAS/14/2017 X-327 (H3N2) ANTIGEN (FORMALDEHYDE INACTIVATED), INFLUENZA B VIRUS B/PHUKET/3073/2013 ANTIGEN (FORMALDEHYDE INACTIVATED), AND INFLUENZA B VIRUS B/MARYLAND/15/2016 BX-69A ANTIGEN (FORMALDEHYDE INACTIVATED) 0.5 ML: 15; 15; 15; 15 INJECTION, SUSPENSION INTRAMUSCULAR at 10:45

## 2019-11-08 RX ADMIN — SODIUM CHLORIDE, POTASSIUM CHLORIDE, SODIUM LACTATE AND CALCIUM CHLORIDE: 600; 310; 30; 20 INJECTION, SOLUTION INTRAVENOUS at 02:24

## 2019-11-08 RX ADMIN — DEXMEDETOMIDINE 0.4 MCG/KG/HR: 100 INJECTION, SOLUTION, CONCENTRATE INTRAVENOUS at 01:41

## 2019-11-08 ASSESSMENT — ACTIVITIES OF DAILY LIVING (ADL)
ADLS_ACUITY_SCORE: 10

## 2019-11-08 ASSESSMENT — MIFFLIN-ST. JEOR: SCORE: 2022.75

## 2019-11-08 NOTE — PLAN OF CARE
ICU End of Shift Summary.  For vital signs and complete assessments, please see documentation flowsheets.     Pertinent assessments: Pt is alert and oriented. Tremors. Reporting some auditory hallucinations. Pt is on a regular diet.  Major Shift Events: Started precedex gtt, clarified with MD whether or not to follow CIWA and admin ativan. No ativan, will continue precedex gtt, gabapentin and scheduled vitamins.   Plan (Upcoming Events): Monitor in ICU. Rass goal is 0 to -1.  Discharge/Transfer Needs: TBD    Bedside Shift Report Completed :   Bedside Safety Check Completed:

## 2019-11-08 NOTE — CONSULTS
11/8/19 chem dep consult acknowledged. Expect consult will be completed by 11/11/19 or earlier. If patient is appropriate for discharge prior to being seen by chem dep, and they have active insurance, an outpatient evaluation can be scheduled by calling [facility] Behavioral Access Line at [phone].    PRISCILA Pineda, [facility]  [phone]

## 2019-11-08 NOTE — PROGRESS NOTES
"[facility]    Hospitalist Progress Note      Assessment & Plan   Lanre Garcia is a 29 year old male history of anxiety/depression and history of complicated alcohol withdrawal in the past who presents with alcohol withdrawal symptoms.     #Alcohol withdrawal with history of withdrawal seizures: Patient also reportedly with an unwitnessed seizure on the a.m. of admission..  Lasted only seconds and described as clenching his jaw in his entire body \"went stiff.\"  Patient did appear tremulous and anxious in the ED despite being given a total of 30 mg of diazepam with some improvement in symptoms.  His last drink was approximately 2300 on 11/6.  He typically drinks about 600 cc of hard liquor daily.  He is interested in cessation.  -He was admitted to the ICU for Precedex drip overnight.  He did well without evidence of significant withdrawal.  He states that he feels significantly improved this a.m decreased anxiety.  -We will wean Precedex drip today, transition to benzodiazepine triggered CIWA protocol   -IV fluids  -IV vitamins followed by daily vitamin therapy  -Patient is interested in cessation and chemical dependency consult has been placed.     If patient does well today off of the Precedex drip.  Can likely transition to the floor with continued management of alcohol withdrawal on CIWA triggered benzodiazepine protocol.    DVT Prophylaxis: Enoxaparin (Lovenox) SQ  Code Status: Full Code  Expected discharge: 2 - 3 days, recommended to prior living arrangement once and out of the window for complicated withdrawal.    She may transfer to the floor later today pending stability off of the Precedex drip.    Khai Luciano MD  Text Page    Interval History   No acute events overnight, patient remained on the Precedex drip.  Patient notes improvement in his symptoms today.  Noted less anxiety and less tremulousness.  No seizures noted.  He states that he did not sleep particularly well.  He denies " any new headache, fevers chills, chest pain chest discomfort, shortness of breath.  He has some mild abdominal pain that is diffuse.    -Data reviewed today: I reviewed all new labs and imaging results over the last 24 hours.     Physical Exam   Temp: 97.4  F (36.3  C) Temp src: Oral BP: (!) 133/102 Pulse: 76 Heart Rate: 86 Resp: 27 SpO2: 95 % O2 Device: None (Room air)    Vitals:    11/07/19 1630 11/08/19 0700   Weight: 97.5 kg (214 lb 15.2 oz) 97.8 kg (215 lb 9.8 oz)     Vital Signs with Ranges  Temp:  [97.4  F (36.3  C)-99.4  F (37.4  C)] 97.4  F (36.3  C)  Pulse:  [] 76  Heart Rate:  [] 86  Resp:  [11-28] 27  BP: (114-166)/() 133/102  SpO2:  [95 %-100 %] 95 %  I/O last 3 completed shifts:  In: 2939.14 [P.O.:1450; I.V.:1489.14]  Out: 2000 [Urine:2000]    Constitutional:  Lying in bed, no tremulousness or significant anxiety noted.  Answers questions appropriately  HEENT: Mucous membranes moist without evidence of tongue lesion.  normocephalic  Respiratory: Clear bilaterally, normal work of breathing  Cardiovascular:  Regular without murmur.  GI: Bowel sounds present, mildly tender in epigastric region, no rebound or guarding.  Lymph/Hematologic: No Bruising noted  Skin: No rashes noted  Musculoskeletal: Nl tone  Neurologic: Cranial nerves II through XII intact, patient answers questions appropriately.  ANO x3.  Moves all extremities.    No tremor noted  Psychiatric:  Improved anxiety    Medications     dexmedetomidine Stopped (11/08/19 1201)     lactated ringers 100 mL/hr at 11/08/19 1150     - MEDICATION INSTRUCTIONS -         enoxaparin ANTICOAGULANT  40 mg Subcutaneous Q24H     [START ON 11/10/2019] folic acid  1 mg Oral Daily     [START ON 11/15/2019] gabapentin  100 mg Oral Q8H     [START ON 11/13/2019] gabapentin  300 mg Oral Q8H     [START ON 11/11/2019] gabapentin  600 mg Oral Q8H     gabapentin  800 mg Oral Q8H     multivitamin w/minerals  1 tablet Oral Daily     sodium chloride (PF)  3  mL Intracatheter Q8H     thiamine  200 mg Intravenous TID     vitamin B1  100 mg Oral Daily     [START ON 11/14/2019] thiamine  100 mg Oral Daily       Data   Recent Labs   Lab 11/08/19  0529 11/07/19  1209   WBC  --  8.6   HGB  --  16.6   MCV  --  95   PLT  --  180   INR  --  0.95    136   POTASSIUM 4.1 3.6   CHLORIDE 106 99   CO2 28 26   BUN 9 8   CR 1.04 0.95   ANIONGAP 5 11   MAYNOR 9.2 9.1   GLC 97 104*   ALBUMIN  --  4.4   PROTTOTAL  --  7.4   BILITOTAL  --  1.2   ALKPHOS  --  51   ALT  --  128*   AST  --  122*       No results found for this or any previous visit (from the past 24 hour(s)).

## 2019-11-08 NOTE — PLAN OF CARE
ICU End of Shift Summary.  For vital signs and complete assessments, please see documentation flowsheets.     Pertinent assessments: Patient remains calm throughout shift. Up to commode with standby, up to chair.   Major Shift Events: Weaned off precedex.    Plan (Upcoming Events): Transfer to [facility]   Discharge/Transfer Needs: Free of withdrawal symptoms    Bedside Shift Report Completed : Yes   Bedside Safety Check Completed:Yes

## 2019-11-08 NOTE — CONSULTS
Care Transition Initial Assessment -      Met with: Patient    Active Problems:    Alcohol withdrawal (H)       DATA  Lives With: parent(s)   Pt moved in with parents Quit his job to be able to look into CD programs     Quality of Family Relationships: helpful, involved  Description of Support System: Supportive, Involved  Who is your support system?: Parent(s)  Support Assessment: Adequate family and caregiver support, Adequate social supports. PT not open to any outside support at this time  Identified issues/concerns regarding health management: H/o ETOH  Had DWI about 4 years ago. Attended court class. Weekly groups for about 6 months. ^ months if probation served    @      Other Resources: Chemical Dependency Services  PT willing to have CD assessment while here if possible  Spoke with pt about [facility]   Quality of Family Relationships: helpful, involved  Transportation Anticipated: family or friend will provide    ASSESSMENT  Cognitive Status:  awake, alert and oriented  Concerns to be addressed: Met with pt. Provided [facility] info, spoke with pt about [facility] of un able to have assessment here or if length of time to get into CD eval with [facility] is booked out  Pt has stable housing at this time. He would like to consider Residential CD program and quit his job to get his life back on track  .     PLAN  Will follow up with pt later today to see if he completed referral form for [facility] proogram and to drop of [facility] info    Will place [facility] CD number ins d.c instructions as well       [facility]  Inpatient Care Coordination   [phone]  [facility]

## 2019-11-08 NOTE — PLAN OF CARE
ICU End of Shift Summary.  For vital signs and complete assessments, please see documentation flowsheets.     Pertinent assessments: VSS,Pt states that he didn't sleep very well. Precedex gtt at 0.4 all shift.Stood at bedside to void ,steady on feet,  Major Shift Events:   Plan (Upcoming Events): / tx to floor  Discharge/Transfer Needs: TBD    Bedside Shift Report Completed : yes  Bedside Safety Check Completed:yes

## 2019-11-09 ENCOUNTER — APPOINTMENT (OUTPATIENT)
Dept: GENERAL RADIOLOGY | Facility: CLINIC | Age: 29
End: 2019-11-09
Attending: INTERNAL MEDICINE
Payer: COMMERCIAL

## 2019-11-09 LAB
ALBUMIN SERPL-MCNC: 3.4 G/DL (ref 3.4–5)
ALP SERPL-CCNC: 91 U/L (ref 40–150)
ALT SERPL W P-5'-P-CCNC: 347 U/L (ref 0–70)
ANION GAP SERPL CALCULATED.3IONS-SCNC: 11 MMOL/L (ref 3–14)
AST SERPL W P-5'-P-CCNC: 796 U/L (ref 0–45)
BILIRUB DIRECT SERPL-MCNC: 0.6 MG/DL (ref 0–0.2)
BILIRUB SERPL-MCNC: 1.4 MG/DL (ref 0.2–1.3)
BUN SERPL-MCNC: 9 MG/DL (ref 7–30)
CALCIUM SERPL-MCNC: 9.1 MG/DL (ref 8.5–10.1)
CHLORIDE SERPL-SCNC: 107 MMOL/L (ref 94–109)
CO2 SERPL-SCNC: 20 MMOL/L (ref 20–32)
CREAT SERPL-MCNC: 0.88 MG/DL (ref 0.66–1.25)
GFR SERPL CREATININE-BSD FRML MDRD: >90 ML/MIN/{1.73_M2}
GLUCOSE SERPL-MCNC: 76 MG/DL (ref 70–99)
POTASSIUM SERPL-SCNC: 5.5 MMOL/L (ref 3.4–5.3)
PROT SERPL-MCNC: 6.8 G/DL (ref 6.8–8.8)
SODIUM SERPL-SCNC: 138 MMOL/L (ref 133–144)

## 2019-11-09 PROCEDURE — 25000132 ZZH RX MED GY IP 250 OP 250 PS 637: Performed by: INTERNAL MEDICINE

## 2019-11-09 PROCEDURE — 25000132 ZZH RX MED GY IP 250 OP 250 PS 637: Performed by: HOSPITALIST

## 2019-11-09 PROCEDURE — 80048 BASIC METABOLIC PNL TOTAL CA: CPT | Performed by: HOSPITALIST

## 2019-11-09 PROCEDURE — 25000128 H RX IP 250 OP 636: Performed by: PHYSICIAN ASSISTANT

## 2019-11-09 PROCEDURE — 25000132 ZZH RX MED GY IP 250 OP 250 PS 637: Performed by: PHYSICIAN ASSISTANT

## 2019-11-09 PROCEDURE — 12000000 ZZH R&B MED SURG/OB

## 2019-11-09 PROCEDURE — H0001 ALCOHOL AND/OR DRUG ASSESS: HCPCS | Mod: HF

## 2019-11-09 PROCEDURE — 80076 HEPATIC FUNCTION PANEL: CPT | Performed by: HOSPITALIST

## 2019-11-09 PROCEDURE — 99232 SBSQ HOSP IP/OBS MODERATE 35: CPT | Performed by: INTERNAL MEDICINE

## 2019-11-09 PROCEDURE — 25000132 ZZH RX MED GY IP 250 OP 250 PS 637

## 2019-11-09 PROCEDURE — 36415 COLL VENOUS BLD VENIPUNCTURE: CPT | Performed by: HOSPITALIST

## 2019-11-09 PROCEDURE — 25800030 ZZH RX IP 258 OP 636: Performed by: HOSPITALIST

## 2019-11-09 PROCEDURE — 74019 RADEX ABDOMEN 2 VIEWS: CPT

## 2019-11-09 RX ORDER — SIMETHICONE 80 MG
80 TABLET,CHEWABLE ORAL EVERY 6 HOURS PRN
Status: DISCONTINUED | OUTPATIENT
Start: 2019-11-09 | End: 2019-11-11 | Stop reason: HOSPADM

## 2019-11-09 RX ORDER — SODIUM CHLORIDE 9 MG/ML
INJECTION, SOLUTION INTRAVENOUS CONTINUOUS
Status: DISCONTINUED | OUTPATIENT
Start: 2019-11-09 | End: 2019-11-11 | Stop reason: HOSPADM

## 2019-11-09 RX ADMIN — GABAPENTIN 800 MG: 400 CAPSULE ORAL at 08:46

## 2019-11-09 RX ADMIN — MULTIPLE VITAMINS W/ MINERALS TAB 1 TABLET: TAB at 08:46

## 2019-11-09 RX ADMIN — CLONIDINE HYDROCHLORIDE 0.1 MG: 0.1 TABLET ORAL at 21:52

## 2019-11-09 RX ADMIN — GABAPENTIN 800 MG: 400 CAPSULE ORAL at 17:16

## 2019-11-09 RX ADMIN — CLONIDINE HYDROCHLORIDE 0.1 MG: 0.1 TABLET ORAL at 08:46

## 2019-11-09 RX ADMIN — FAMOTIDINE 20 MG: 40 POWDER, FOR SUSPENSION ORAL at 08:49

## 2019-11-09 RX ADMIN — SODIUM CHLORIDE, POTASSIUM CHLORIDE, SODIUM LACTATE AND CALCIUM CHLORIDE: 600; 310; 30; 20 INJECTION, SOLUTION INTRAVENOUS at 07:08

## 2019-11-09 RX ADMIN — GABAPENTIN 800 MG: 400 CAPSULE ORAL at 00:48

## 2019-11-09 RX ADMIN — Medication 100 MG: at 08:47

## 2019-11-09 RX ADMIN — MELATONIN TAB 3 MG 3 MG: 3 TAB at 00:48

## 2019-11-09 RX ADMIN — ENOXAPARIN SODIUM 40 MG: 40 INJECTION SUBCUTANEOUS at 17:16

## 2019-11-09 ASSESSMENT — ACTIVITIES OF DAILY LIVING (ADL)
ADLS_ACUITY_SCORE: 10

## 2019-11-09 NOTE — PROGRESS NOTES
Rule 25 Assessment  Background Information   1. Date of Assessment Request  2. Date of Assessment  11/9/2019 3. Date Service Authorized     4.   GARTH Garcia   5.  Phone Number   [phone] 6. Referent  Hospital 7. Assessment Site  [facility]     8. Client Name   Lanre Garcia 9. Date of Birth  1990 Age  29 year old 10. Gender  male  11. PMI/ Insurance No.  [id number]   12. Client's Primary Language:  English 13. Do you require special accommodations, such as an  or assistance with written material? No   14. Current Address: [address] [place] [address]   15. Client Phone Numbers: [phone] VM is okay.     16. Tell me what has happened to bring you here today.    Per EHR ED admission note on 11/7/19:  History      Chief Complaint:  Alcohol withdrawal      HPI  Lanre Garcia is a 29 year old male who presents to the emergency department today for evaluation of alcohol withdrawal. The patient reports that this morning, he was asleep and was woken up for a brief moment before seeing flashing lights and chattering teeth and feeling tensed up. He believes this lasted a total of 10 seconds and doesn't think he lost consciousness. No bowel or bladder incontinence. He believes it was a seizure and after the episode, he felt very nauseous. He is still nauseous here and has tremors. The patient reports 4-5 episodes of alcohol withdrawal. He last had 3 seizures in a 5 minute period during his last episode of withdrawal, though states he was at home and wasn't evaluated in the emergency department at that time. His last drink was 12 hours ago, though he states it typically takes over 24 hours for him to go into withdrawal. He typically drinks a total of 600 mL daily of 100 proof schnapps. No recent head trauma or falls. No fevers, black or bloody stools, vomiting, diarrhea, abdominal pain, headache, chest pain, shortness of breath,  hallucinations, confusion, or any other symptoms. No suicidal or homicidal ideation.      17. Have you had other rule 25 assessments?     No    DIMENSION I - Acute Intoxication /Withdrawal Potential   1. Chemical use most recent 12 months outside a facility and other significant use history (client self-report)              X = Primary Drug Used   Age of First Use Most Recent Pattern of Use and Duration   Need enough information to show pattern (both frequency and amounts) and to show tolerance for each chemical that has a diagnosis   Date of last use and time, if needed   Withdrawal Potential? Requiring special care Method of use  (oral, smoked, snort, IV, etc)     X Alcohol     20 Per patient:correct pattern below, pattern been going on for at that level-probably three-four months. Have had worse. Higher one at different point. Prior 3-4 months months ago-daily 800-900ml per day. That pattern went on for 8 months. About a year ago.      per ED admission note:  His last drink was 12 hours ago, though he states it typically takes over 24 hours for him to go into withdrawal. He typically drinks a total of 600 mL daily of 100 proof schnapps. 11/6/19, evening, about 600-650ml of 100 proof schnapps 100 peaches.  Yes currently at [facility]. oral      Marijuana/  Hashish   21 Per patient:prior to 6 months ago, 2-3 bowls per day. Pattern been going for-year and half. Abstaining for past 6 months-not having it and not drinking.Also needed to get a new job to pass a drug test, never went back to it, easier to get booze. Hated mixing them.  6 months ago no smoke      Cocaine/Crack     No use          Meth/  Amphetamines   No use          Heroin     No use          Other Opiates/  Synthetics   No use          Inhalants     No use          Benzodiazepines     No use          Hallucinogens     21-22 Per patient: acid 1x December of 2018 no oral      Barbiturates/  Sedatives/  Hypnotics No use          Over-the-Counter Drugs   No use           Other     No use          Nicotine     No use         2. Do you use greater amounts of alcohol/other drugs to feel intoxicated or achieve the desired effect?  Yes.  Or use the same amount and get less of an effect?  Yes.  Example: The patient reported having increased use and tolerance issues with alcohol and marijuana.    3A. Have you ever been to detox?     No    3B. When was the first time?     The patient denied ever having a detoxification admission.    3C. How many times since then?     The patient denied ever having a detoxification admission.    3D. Date of most recent detox:     The patient denied ever having a detoxification admission.    4.  Withdrawal symptoms: Have you had any of the following withdrawal symptoms?  Past 12 months Recent (past 30 days)   Sweating (Rapid Pulse)  Shaky / Jittery / Tremors  Unable to Sleep  Agitation  Headache  Fatigue / Extremely Tired  Sad / Depressed Feeling  Muscle Aches  Vivid / Unpleasant Dreams  Nausea / Vomiting  Dizziness  Seizures  Diarrhea  Diminished Appetite  Confused / Disrupted Speech  Anxiety / Worried Sweating (Rapid Pulse)  Shaky / Jittery / Tremors  Unable to Sleep  Agitation  Headache  Fatigue / Extremely Tired  Sad / Depressed Feeling  Muscle Aches  Vivid / Unpleasant Dreams  Nausea / Vomiting  Dizziness  Seizures  Diarrhea  Diminished Appetite  Confused / Disrupted Speech  Anxiety / Worried     's Visual Observations and Symptoms: currently admitted to [facility].     Based on the above information, is withdrawal likely to require attention as part of treatment participation?  No    Dimension I Ratings   Acute intoxication/Withdrawal potential - The placing authority must use the criteria in Dimension I to determine a client s acute intoxication and withdrawal potential.    RISK DESCRIPTIONS - Severity ratin Client can tolerate and cope with withdrawal discomfort. The client displays mild to moderate intoxication or signs and symptoms  interfering with daily functioning but does not immediately endanger self or others. Client poses minimal risk of severe withdrawal.    REASONS SEVERITY WAS ASSIGNED (What about the amount of the person s use and date of most recent use and history of withdrawal problems suggests the potential of withdrawal symptoms requiring professional assistance? )     Pt admitted to [facility] for alcohol withdrawal. Pt is currently on assisted medication for w/d symptoms.          DIMENSION II - Biomedical Complications and Conditions   1a. Do you have any current health/medical conditions?(Include any infectious diseases, allergies, or chronic or acute pain, history of chronic conditions)       Yes.   Illnesses/Medical Conditions you are receiving care for: per EHR medical hx:  .Allergies:  No known drug allergies     Medications:    The patient is not currently taking any prescribed medications.     Past Medical History:    Alcoholism   Seizures  Major depressive disorder     Past Surgical History:    History reviewed. No pertinent surgical history.     Family History:    History reviewed. No pertinent family history.      Social History:  Smoking status: Never  Alcohol use: Yes  Marital Status: Single        1b. On a scale of mild, moderate to severe please specify the severity of the patient's diabetes and/or neuropathy.    The patient denied having a history of being diagnosed with diabetes or neuropathy.    2. Do you have a health care provider? When was your most recent appointment? What concerns were identified?     The patient's Primary Medical Clinic is [facility]. Dr. Alanis.    3. If indicated by answers to items 1 or 2: How do you deal with these concerns? Is that working for you? If you are not receiving care for this problem, why not?      Pt currently on medications.     4A. List current medication(s) including over-the-counter or herbal supplements--including pain management:     Per EHR prescribed  medication hx:    Current Facility-Administered Medications   Medication     bisacodyl (DULCOLAX) Suppository 10 mg     cloNIDine (CATAPRES) tablet 0.1 mg     enoxaparin ANTICOAGULANT (LOVENOX) injection 40 mg     famotidine (PEPCID) suspension 20 mg     [START ON 11/10/2019] folic acid (FOLVITE) tablet 1 mg     [START ON 11/15/2019] gabapentin (NEURONTIN) capsule 100 mg     [START ON 11/13/2019] gabapentin (NEURONTIN) capsule 300 mg     [START ON 11/11/2019] gabapentin (NEURONTIN) capsule 600 mg     gabapentin (NEURONTIN) capsule 800 mg     ibuprofen (ADVIL/MOTRIN) tablet 600 mg     lidocaine (LMX4) cream     lidocaine 1 % 0.1-1 mL     LORazepam (ATIVAN) tablet 1-2 mg    Or     LORazepam (ATIVAN) injection 1-2 mg     magnesium hydroxide (MILK OF MAGNESIA) suspension 30 mL     magnesium sulfate 2 g in NS intermittent infusion ([facility]ar[facility] or [facility] Cmpd)     magnesium sulfate 4 g in 100 mL sterile water (premade)     melatonin tablet 3 mg     metoprolol (LOPRESSOR) injection 5 mg     multivitamin w/minerals (THERA-VIT-M) tablet 1 tablet     naloxone (NARCAN) injection 0.1-0.4 mg     ondansetron (ZOFRAN-ODT) ODT tab 8 mg    Or     ondansetron (ZOFRAN) injection 8 mg     Patient is already receiving anticoagulation with heparin, enoxaparin (LOVENOX), warfarin (COUMADIN)  or other anticoagulant medication     potassium chloride (KLOR-CON) Packet 20-40 mEq     potassium chloride 10 mEq in 100 mL intermittent infusion with 10 mg lidocaine     potassium chloride 10 mEq in 100 mL sterile water intermittent infusion (premix)     potassium chloride ER (K-DUR/KLOR-CON M) CR tablet 20-40 mEq     potassium phosphate 10 mmol in D5W 250 mL intermittent infusion     potassium phosphate 15 mmol in D5W 250 mL intermittent infusion     potassium phosphate 20 mmol in D5W 250 mL intermittent infusion     potassium phosphate 20 mmol in D5W 500 mL intermittent infusion     potassium phosphate 25 mmol in D5W 500 mL intermittent infusion      prochlorperazine (COMPAZINE) injection 10 mg    Or     prochlorperazine (COMPAZINE) tablet 10 mg    Or     prochlorperazine (COMPAZINE) Suppository 25 mg     senna-docusate (SENOKOT-S/PERICOLACE) 8.6-50 MG per tablet 1 tablet    Or     senna-docusate (SENOKOT-S/PERICOLACE) 8.6-50 MG per tablet 2 tablet     sodium chloride (PF) 0.9% PF flush 3 mL     sodium chloride (PF) 0.9% PF flush 3 mL     vitamin B1 (THIAMINE) tablet 100 mg     Admission medication history interview status for this patient is complete. See [facility] admission navigator for allergy information, prior to admission medications and immunization status.      Medication history interview source(s):Patient  Medication history resources (including written lists, pill bottles, clinic record):None  [facility]     Changes made to PTA medication list:  Added: none  Deleted: none  Changed: none     Actions taken by pharmacist (provider contacted, etc):None      Additional medication history information:None     Medication reconciliation/reorder completed by provider prior to medication history?  No      Do you take OTC medications (eg tylenol, ibuprofen, fish oil, eye/ear drops, etc)? No      For patients on insulin therapy: No        Prior to Admission medications    Not on File        4B. Do you follow current medical recommendations/take medications as prescribed?     Yes    Per patient:Stopped taking the antidepressant medications because it said not to mix them and when I did mix them had seizure. Stopped taking them-probably a year ago or more.     4C. When did you last take your medication?     today    4D. Do you need a referral to have a follow up with a primary care physician?    No.    5. Has a health care provider/healer ever recommended that you reduce or quit alcohol/drug use?     Yes    6. Are you pregnant?     NA, because the patient is male    7. Have you had any injuries, assaults/violence towards you, accidents, health  related issues, overdose(s) or hospitalizations related to your use of alcohol or other drugs:     Yes, explain: currently admitted to [facility].     8. Do you have any specific physical needs/accommodations? No    Dimension II Ratings   Biomedical Conditions and Complications - The placing authority must use the criteria in Dimension II to determine a client s biomedical conditions and complications.   RISK DESCRIPTIONS - Severity ratin Client displays full functioning with good ability to cope with physical discomfort.    REASONS SEVERITY WAS ASSIGNED (What physical/medical problems does this person have that would inhibit his or her ability to participate in treatment? What issues does he or she have that require assistance to address?)    Pt reported medical condition. Pt reported having a primary care provider and reported he is able to get the services he needs. Pt denied taking any prescribed medications prior to admission.         DIMENSION III - Emotional, Behavioral, Cognitive Conditions and Complications   1. (Optional) Tell me what it was like growing up in your family. (substance use, mental health, discipline, abuse, support)     Per patient: great, did not think until college, maybe have depression, always had seasonal depression family life awesome, no abuse of any kind. Good relationship with sister and still close. Parents amazing. Parents together. Grew up in [place], [place].  On mother's side alcoholism. Grandmother, mother herself, and many cousins and aunts, and one uncle. All alcoholics, and then dad side, my dad's grandfather was an alcoholic and that what killed him. Liver failure. My grandpa he never drank. They grew up with no drinking.     2. When was the last time that you had significant problems...  A. with feeling very trapped, lonely, sad, blue, depressed or hopeless  about the future? Past Month-pt has dx of MDD.     B. with sleep trouble, such as bad dreams, sleeping restlessly, or  falling  asleep during the day? Past Month-when detoxing yes, not when drunk, pass out. When sober have vivid dreams. Detoxing horrible sleep.     C. with feeling very anxious, nervous, tense, scared, panicked, or like  something bad was going to happen? Past Month-pt reported related to work.     D. with becoming very distressed and upset when something reminded  you of the past? Never    E. with thinking about ending your life or committing suicide? 1+ years ago-denied past attempts. Mostly in college.     3. When was the last time that you did the following things two or more times?  A. Lied or conned to get things you wanted or to avoid having to do  something? Never    B. Had a hard time paying attention at school, work, or home? Never    C. Had a hard time listening to instructions at school, work, or home? Never    D. Were a bully or threatened other people? Never    E. Started physical fights with other people? Never    Note: These questions are from the Global Appraisal of Individual Needs--Short Screener. Any item marked  past month  or  2 to 12 months ago  will be scored with a severity rating of at least 2.     For each item that has occurred in the past month or past year ask follow up questions to determine how often the person has felt this way or has the behavior occurred? How recently? How has it affected their daily living? And, whether they were using or in withdrawal at the time?    See above    4A. If the person has answered item 2E with  in the past year  or  the past month , ask about frequency and history of suicide in the family or someone close and whether they were under the influence.     Pt denied current SI.     Any history of suicide in your family? Or someone close to you?     At least one great uncle kill himself, that must have been when I was 12.     4B. If the person answered item 2E  in the past month  ask about  intent, plan, means and access and any other follow-up  information  to determine imminent risk. Document any actions taken to intervene  on any identified imminent risk.      The patient denied having any suicide ideation within the past month.    5A. Have you ever been diagnosed with a mental health problem?     Yes, explain: Per EHR and pt reported MDD.       5B. Are you receiving care for any mental health issues? If yes, what is the focus of that care or treatment?  Are you satisfied with the service? Most recent appointment?  How has it been helpful?     Yes, per patient: past therapy, for depression and anxiety, and one on one for alcoholism for court/group session. Once per week therapy and once per week group, did that for six month. [facility]-that was three to four years ago. The therapy was four to five years ago for a year.     6. Have you been prescribed medications for emotional/psychological problems?     Yes, pt reported he stopped taking his prescribed antidepressants a year ago as he was using and he he used and mixed his medications he had seizures.     7. Does your MH provider know about your use?     The patient does not currently have any mental health providers.    8A. Have you ever been verbally, emotionally, physically or sexually abused?      No     Follow up questions to learn current risk, continuing emotional impact.      The patient denied having any history of being verbally, emotionally, physically or sexually abused.    8B. Have you received counseling for abuse?      The patient denied having any history of being verbally, emotionally, physically or sexually abused.    9. Have you ever experienced or been part of a group that experienced community violence, historical trauma, rape or assault?     No    10A. Damon:    No    11. Do you have problems with any of the following things in your daily life?    Concentration      Note: If the person has any of the above problems, follow up with items 12, 13, and 14. If none of  the issues in item 11 are a problem for the person, skip to item 15.    The patient would benefit from developing sober coping skills.    12. Have you been diagnosed with traumatic brain injury or Alzheimer s?  No    13. If the answer to #12 is no, ask the following questions:    Have you ever hit your head or been hit on the head? Yes    Were you ever seen in the Emergency Room, hospital or by a doctor because of an injury to your head? Yes-as a kid.     Have you had any significant illness that affected your brain (brain tumor, meningitis, West Nile Virus, stroke or seizure, heart attack, near drowning or near suffocation)? Yes-seizure-19. In total have had four seizures.     14. If the answer to #12 is yes, ask if any of the problems identified in #11 occurred since the head injury or loss of oxygen. No    15A. Highest grade of school completed:     Some college, but no degree-[employer] year of college.     15B. Do you have a learning disability? No    15C. Did you ever have tutoring in Math or English? No    15D. Have you ever been diagnosed with Fetal Alcohol Effects or Fetal Alcohol Syndrome? No    16. If yes to item 15 B, C, or D: How has this affected your use or been affected by your use?     The patient denied having any history of a learning disability, tutoring in math or English or being diagnosed with Fetal Alcohol Effects or Fetal Alcohol Syndrome.    Dimension III Ratings   Emotional/Behavioral/Cognitive - The placing authority must use the criteria in Dimension III to determine a client s emotional, behavioral, and cognitive conditions and complications.   RISK DESCRIPTIONS - Severity ratin Client has difficulty with impulse control and lacks coping skills. Client has thoughts of suicide or harm to others without means; however, the thoughts may interfere with participation in some treatment activities. Client has difficulty functioning in significant life areas. Client has moderate symptoms  of emotional, behavioral, or cognitive problems. Client is able to participate in most treatment activities.    REASONS SEVERITY WAS ASSIGNED - What current issues might with thinking, feelings or behavior pose barriers to participation in a treatment program? What coping skills or other assets does the person have to offset those issues? Are these problems that can be initially accommodated by a treatment provider? If not, what specialized skills or attributes must a provider have?    Pt reported mental health diagnosis. Pt reported past prescribed medications for MH symptoms.  Pt reported past therapy but denied current. Pt denied past trauma/abuse issues or current issues. Pt denied current SI. Pt denied past suicide attempts.         DIMENSION IV - Readiness for Change   1. You ve told me what brought you here today. (first section) What do you think the problem really is?     Per patient:have been drinking for so long, always had mindset I can do it by myself, or a meeting a week  and that will work out. When I tried to quit w/d horrible, make a week sober and then I think I can drink again, just a few and spirals drinking heavily again. So when I woke up with seizure and shakes, usually w/d 24-36 hours and this was only 8 hours and full shakes and hard to breath, and really bad, I cannot juggle social life, and job, and all this other stuff, as soon as done with job go to liquor store and drink, cannot do it, and cannot do it by myself. Dad took me to hospital. Need to seek treatment, residential treatment. Want to have myself in setting that I cannot access it. I will buy booze if I do not, want to commit myself. Good amount of time to learn skills and cope and tips on getting thoughts out of head and triggers. Quit job to do it, did not give a two week notice so cannot go back there again. That sucks.     2. Tell me how things are going. Ask enough questions to determine whether the person has use related  problems or assets that can be built upon in the following areas: Family/friends/relationships; Legal; Financial; Emotional; Educational; Recreational/ leisure; Vocational/employment; Living arrangements (DSM)      Per patient:  Relationships-family-just okay they hate how much I drink, they will call me out on it. Will not let me stay if drinking or visit. Friend relationships-two roommates some of best friends, we would hang out those were fine. All other friends have not kept up with at all. Never hung out with because extremely drunk. Talk to some of them sometimes and talked to them about it and get it.  Legal-DWI- 3-4 years ago, did the group and individual therapy, no current legals.  Hobbies-videogames, YouTube channels huge fan of that I watch. Movies.   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    3. What activities have you engaged in when using alcohol/other drugs that could be hazardous to you or others (i.e. driving a car/motorcycle/boat, operating machinery, unsafe sex, sharing needles for drugs or tattoos, etc     The patient reported having a history of driving while under the influence of alcohol or drugs.    4. How much time do you spend getting, using or getting over using alcohol or drugs? (DSM)     Per patient:about 8 hours.     5. Reasons for drinking/drug use (Use the space below to record answers. It may not be necessary to ask each item.)  Like the feeling Yes   Trying to forget problems Yes   To cope with stress Yes   To relieve physical pain No   To cope with anxiety Yes   To cope with depression Yes   To relax or unwind Yes   Makes it easier to talk with people Yes   Partner encourages use No   Most friends drink or use No   To  cope with family problems No   Afraid of withdrawal symptoms/to feel better Yes   Other (specify)  No     A. What concerns other people about your alcohol or drug use/Has anyone told you that you use too much? What did they say? (DSM)     Per patient: both my parents, both my roommates. Afraid of it.     B. What did you think about that/ do you think you have a problem with alcohol or drug use?     Pt reported he knows it is a problem    6. What changes are you willing to make? What substance are you willing to stop using? How are you going to do that? Have you tried that before? What interfered with your success with that goal?      Pt reported he would like to seek residential treatment and focus on learning coping skills to abstain from use.     7. What would be helpful to you in making this change?     Pt reported residential treatment.     Dimension IV Ratings   Readiness for Change - The placing authority must use the criteria in Dimension IV to determine a client s readiness for change.   RISK DESCRIPTIONS - Severity ratin Client is cooperative, motivated, ready to change, admits problems, committed to change, and engaged in treatment as a responsible participant.    REASONS SEVERITY WAS ASSIGNED - (What information did the person provide that supports your assessment of his or her readiness to change? How aware is the person of problems caused by continued use? How willing is she or he to make changes? What does the person feel would be helpful? What has the person been able to do without help?)      Pt reported he is seeking residential treatment as he would like to learn coping skills. Pt reported he knows his use is a problem. Pt reported his parents and his roommates have expressed concern about his use.          DIMENSION V - Relapse, Continued Use, and Continued Problem Potential   1A. In what ways have you tried to control, cut-down or quit your use? If you have had periods of sobriety, how did  you accomplish that? What was helpful? What happened to prevent you from continuing your sobriety? (DSM)     Per patient:daily use of alcohol for the last year. Pt reported for marijuana-prior to 6 months ago, 2-3 bowls per day. Pattern been going for-year and half. Abstaining for past 6 months-not having it and not drinking.Also needed to get a new job to pass a drug test, never went back to it, easier to get booze. Hated mixing them.     1B. What were the circumstances of your most recent relapse with mood altering chemicals?    Per patient:my knew job as supervisor, control of lot of people, and fourth quarter, busy time for work. Stress of that. 8 hours of doing that want to unwind and forget about it.     2. Have you experienced cravings? If yes, ask follow up questions to determine if the person recognizes triggers and if the person has had any success in dealing with them.     The patient reported having cravings to use mood altering chemicals on an almost daily basis. Even after getting sick I would still drink more, even drinking on empty stomach.     3. Have you been treated for alcohol/other drug abuse/dependence? No    4. Support group participation: Have you/do you attend support group meetings to reduce/stop your alcohol/drug use? How recently? What was your experience? Are you willing to restart? If the person has not participated, is he or she willing?     No sober support group meetings.     5. What would assist you in staying sober/straight?     Pt reported residential treatment.     Dimension V Ratings   Relapse/Continued Use/Continued problem potential - The placing authority must use the criteria in Dimension V to determine a client s relapse, continued use, and continued problem potential.   RISK DESCRIPTIONS - Severity ratin No awareness of the negative impact of mental health problems or substance abuse. No coping skills to arrest mental health or addiction illnesses, or prevent  relapse.    REASONS SEVERITY WAS ASSIGNED - (What information did the person provide that indicates his or her understanding of relapse issues? What about the person s experience indicates how prone he or she is to relapse? What coping skills does the person have that decrease relapse potential?)      Pt reported he has been using alcohol daily and marijuana in the past prior to 6 months ago it was daily. Pt reported his alcohol use increased after he stopped using marijuana. Pt reported cravings. Pt denied past treatment and sober support group meeting attendance. Pt appears to lack impulse control, sober coping skills, and long-term sober maintenance skills. Pt is at a high risk for relapse/continued use. Pt has co-occurring disorders which places him at higher risk for continued use/relapse.         DIMENSION VI - Recovery Environment   1. Are you employed/attending school? Tell me about that.     Pt reported he was working full time. Pt reported he quit to seek residential treatment.     2A. Describe a typical day; evening for you. Work, school, social, leisure, volunteer, spiritual practices. Include time spent obtaining, using, recovering from drugs or alcohol. (DSM)     Varied, 8-10 hour shifts and then would drink when not working.     Please describe what leisure activities have been associated with your substance abuse:     videogames     2B. How often do you spend more time than you planned using or use more than you planned? (DSM)     Pt reported daily.     3. How important is using to your social connections? Do many of your family or friends use?     Per patient: not really with alcohol but when I was smoking weed my cousin's and I would connect over.     4A. Are you currently in a significant relationship?     No    4C. Sexual Orientation:     Heterosexual    5A. Who do you live with?      parents    5B. Tell me about their alcohol/drug use and mental health issues.     Dad has beer time to time 2  beers.  My mom attends AA meetings.     5C. Are you concerned for your safety there? No    5D. Are you concerned about the safety of anyone else who lives with you? No    6A. Do you have children who live with you?     No    6B. Do you have children who do not live with you?     No    7A. Who supports you in making changes in your alcohol or drug use? What are they willing to do to support you? Who is upset or angry about you making changes in your alcohol or drug use? How big a problem is this for you?      Pt reported his parents and his roommates    7B. This table is provided to record information about the person s relationships and available support It is not necessary to ask each item; only to get a comprehensive picture of their support system.  How often can you count on the following people when you need someone?   Partner / Spouse The patient does not have a current partner or spouse.   Parent(s)/Aunt(s)/Uncle(s)/Grandparents Always supportive   Sibling(s)/Cousin(s) Always supportive   Child(lety) The patient doesn't have any current contact with children.   Other relative(s) The patient doesn't have any current contact with other relatives.   Friend(s)/neighbor(s) Usually supportive   Child(lety) s father(s)/mother(s) The patient doesn't have any current contact with children(s) mother or father.   Support group member(s) The patient denied having any current involvement with 12-step or other support group meetings.   Community of iraj members The patient denied having any current involvement with community iraj members.   /counselor/therapist/healer The patient denied having any current involvement with a , counselor, therapist or healer.   Other (specify) No     8A. What is your current living situation?     Pt reported with his parents currently.     8B. What is your long term plan for where you will be living?     Pt reported unsure.     8C. Tell me about your living  environment/neighborhood? Ask enough follow up questions to determine safety, criminal activity, availability of alcohol and drugs, supportive or antagonistic to the person making changes.      Safe     9. Criminal justice history: Gather current/recent history and any significant history related to substance use--Arrests? Convictions? Circumstances? Alcohol or drug involvement? Sentences? Still on probation or parole? Expectations of the court? Current court order? Any sex offenses - lifetime? What level? (DSM)    Pt reported past denied currently.     10. What obstacles exist to participating in treatment? (Time off work, childcare, funding, transportation, pending prison time, living situation)     The patient denied having any obstacles for participating in substance abuse treatment.    Dimension VI Ratings   Recovery environment - The placing authority must use the criteria in Dimension VI to determine a client s recovery environment.   RISK DESCRIPTIONS - Severity ratin Client has (A) Chronically antagonistic significant other, living environment, family, peer group or long-term criminal justice involvement that is harmful to recovery or treatment progress, or (B) Client has an actively antagonistic significant other, family, work, or living environment with immediate threat to the client's safety and well-being.    REASONS SEVERITY WAS ASSIGNED - (What support does the person have for making changes? What structure/stability does the person have in his or her daily life that will increase the likelihood that changes can be sustained? What problems exist in the person s environment that will jeopardize getting/staying clean and sober?)     Pt reported he was working full time but quit to seek residential treatment as he was using daily and his w/d was increasing to needing hospitalization sooner. Pt denied being in romantic relationship or having children at this time. Pt reported his parents and roommates  are his support. Pt reported his dad has occasion beer. Pt denied current legal issues. Pt appears to lack a living environment conducive to sobriety.          Client Choice/Exceptions   Would you like services specific to language, age, gender, culture, Hoahaoism preference, race, ethnicity, sexual orientation or disability?  No    What particular treatment choices and options would you like to have? Residential/inpatient    Do you have a preference for a particular treatment program? None    Criteria for Diagnosis     Criteria for Diagnosis  DSM-5 Criteria for Substance Use Disorder  Instructions: Determine whether the client currently meets the criteria for Substance Use Disorder using the diagnostic criteria in the DSM-V pp.[phone]. Current means during the most recent 12 months outside a facility that controls access to substances    Category of Substance Severity (ICD-10 Code / DSM 5 Code)     Alcohol Use Disorder Severe  (10.20) (303.90)   Cannabis Use Disorder Mild  (F12.10) (305.20) in early remission   Hallucinogen Use Disorder The patient does not meet the criteria for a Hallucinogen use disorder.   Inhalant Use Disorder The patient does not meet the criteria for an Inhalant use disorder.   Opioid Use Disorder The patient does not meet the criteria for an Opioid use disorder.   Sedative, Hypnotic, or Anxiolytic Use Disorder The patient does not meet the criteria for a Sedative/Hypnotic use disorder.   Stimulant Related Disorder The patient does not meet the criteria for a Stimulant use disorder.   Tobacco Use Disorder The patient does not meet the criteria for a Tobacco use disorder.   Other (or unknown) Substance Use Disorder The patient does not meet the criteria for a Other (or unknown) Substance use disorder.       Collateral Contact Summary   Number of contacts made: 1    Contact with referring person:  Yes    If court related records were reviewed, summarize here: No court records had been reviewed  at the time of this documentation.    Information from collateral contacts supported/largely agreed with information from the client and associated risk ratings.      Rule 25 Assessment Summary and Plan   's Recommendation    1. Abstain from using all non-prescribed mood altering chemicals and substances. Take all medication as prescribed and directed by licensed physicians.  2. Complete an /residential treatment program such as [facility], [facility], or [facility].      Collateral Contacts     Name:    Electronic Health Records (EHR)   Relationship:    Health records   Phone Number:    None Releases:    Yes     Pt medical record was reviewed and utilized for collateral purposes of this assessment and largely agreed with the information from the patient.    ollateral Contacts      A problematic pattern of alcohol/drug use leading to clinically significant impairment or distress, as manifested by at least two of the following, occurring within a 12-month period:    1.) Alcohol/drug is often taken in larger amounts or over a longer period than was intended.  2.) There is a persistent desire or unsuccessful efforts to cut down or control alcohol/drug use  3.) A great deal of time is spent in activities necessary to obtain alcohol, use alcohol, or recover from its effects.  4.) Craving, or a strong desire or urge to use alcohol/drug  6.) Continued alcohol use despite having persistent or recurrent social or interpersonal problems caused or exacerbated by the effects of alcohol/drug.  8.) Recurrent alcohol/drug use in situations in which it is physically hazardous.  9.) Alcohol/drug use is continued despite knowledge of having a persistent or recurrent physical or psychological problem that is likely to have been caused or exacerbated by alcohol.  10.) Tolerance, as defined by either of the following: A need for markedly increased amounts of alcohol/drug to achieve intoxication or desired effect.  and A markedly diminished effect with continued use of the same amount of alcohol/drug.  11.) Withdrawal, as manifested by either of the following: The characteristic withdrawal syndrome for alcohol/drug (refer to Criteria A and B of the criteria set for alcohol/drug withdrawal). and Alcohol/drug (or a closely related substance, such as a benzodiazepine) is taken to relieve or avoid withdrawal symptoms.      Specify if: In early remission:  After full criteria for alcohol/drug use disorder were previously met, none of the criteria for alcohol/drug use disorder have been met for at least 3 months but for less than 12 months (with the exception that Criterion A4,  Craving or a strong desire or urge to use alcohol/drug  may be met).     In sustained remission:   After full criteria for alcohol use disorder were previously met, non of the criteria for alcohol/drug use disorder have been met at any time during a period of 12 months or longer (with the exception that Criterion A4,  Craving or strong desire or urge to use alcohol/drug  may be met).   Specify if:   This additional specifier is used if the individual is in an environment where access to alcohol is restricted.    Mild: Presence of 2-3 symptoms  Moderate: Presence of 4-5 symptoms  Severe: Presence of 6 or more symptoms

## 2019-11-09 NOTE — PLAN OF CARE
Pt arrived to floor @ 2130. Oriented to room and call light system. Up with SBA. Sepsis protocol triggered upon arrival. Lactic 0.7. On remote tele. Hypertensive and tachycardic, MD ordered scheduled clonidine. Received first dose this evening. LR running @ 100 ml/hr. Scoring 0-1 on CIWA, slight tremor in hands. Reg diet. Denies pain.

## 2019-11-09 NOTE — CONSULTS
The writer met with the patient introduced herself and her role. Pt completed CD assessment and recommendation is for residential treatment which pt reported he desired. Pt signed release of information for [facility]. The writer will finish the assessment and send the referral to [facility], [place], [facility]. The writer left the patient resources and her business card to follow up with her.

## 2019-11-09 NOTE — PROGRESS NOTES
[facility]  [address], [address]  [place] [address]        ADULT CD ASSESSMENT ADDENDUM      Patient Name: Lanre Garcia  Cell Phone:   Home: [phone] (home)    Mobile:   Telephone Information:   Mobile [phone]       Email:  [email or web address]  Emergency Contact: None   Tel: None    The patient reported being:  Single, no serious involvement    With which race do you identify? White    Initial Screening Questions     1. Are you currently having severe withdrawal symptoms that are putting yourself or others in danger?  Yes, currently at [facility].     2. Are you currently having severe medical problems that require immediate attention?  No    3. Are you currently having severe emotional or behavioral problems that are putting yourself or others at risk of harm?  No    4. Do you have sufficient reading skills that will enable you to understand written materials, including the program rules and client rights materials?  Yes     Family History and other additional information     Who raised you? (parents, grandparents, adoptive parents, step-parents, etc.)    Both Parents    Please tell me what it was like growing up in your family. (please include any history of substance abuse, mental health issues, emotional/physical/sexual abuse, forms of discipline, and support)     Per patient: great, did not think until college, maybe have depression, always had seasonal depression family life awesome, no abuse of any kind. Good relationship with sister and still close. Parents amazing. Parents together. Grew up in [place], [place].  On mother's side alcoholism. Grandmother, mother herself, and many cousins and aunts, and one uncle. All alcoholics, and then dad side, my dad's grandfather was an alcoholic and that what killed him. Liver failure. My grandpa he never drank. They grew up with no drinking.     Do you have any children or Stepchildren? No    Are you being investigated by Child  Protection Services? No    Do you have a child protection worker, probation office or ?  No    How would you describe your current finances?  Doing okay    If you are having problems, (unpaid bills, bankruptcy, IRS problems) please explain:  Yes, explain: some credit card debt    If working or a student are you able to function appropriately in that setting? No, explain: due to use.      Describe your preferred learning style:  by hands-on practice and by watching someone else demonstrate    What are your some of your personal strengths?  pt reported great listener and speaker. Good at being .     Do you currently participate in community iraj activities, such as attending Zoroastrianism, temple, Mosque or Voodoo services?  No    How does your spirituality impact your recovery?  Pt denied being spiritual.     Do you currently self-administer your medications?  Yes    Have you ever had to lie to people important to you about how much you torres?   No   Have you ever felt the need to bet more and more money?   No   Have you ever attempted treatment for a gambling problem?   No   Have you ever touched or fondled someone else inappropriately or forced them to have sex with you against their will?   No   Are you or have you ever been a registered sex offender?   No   Is there any history of sexual abuse in your family? No   Have you ever felt obsessed by your sexual behavior, such as having sex with many partners, masturbating often, using pornography often?   No     Have you ever received therapy or stayed in the hospital for mental health problems?   Pt reported past therapy      Have you ever hurt yourself, such as cutting, burning or hitting yourself?   No     Have you ever purged, binged or restricted yourself as a way to control your weight?   No     Are you on a special diet?   No     Do you have any concerns regarding your nutritional status?   No     Have you had any appetite changes in the last  3 months?   No   Have you had weight loss or weight gain of more than 10 lbs in the last 3 months?   If patient gained or lost more than 10 lbs, then refer to program RN / attending Physician for assessment.   No   Was the patient informed of BMI?    Normal, No Intervention   No   Have you engaged in any risk-taking behavior that would put you at risk for exposure to blood-borne or sexually transmitted diseases?   No   Do you have any dental problems?   No   Have you ever lived through any trauma or stressful life events?   No   In the past month, have you had any of the following symptoms related to the trauma listed above? (dreams, intense memories, flashbacks, physical reactions, etc.)   No   Have you ever believed people were spying on you, or that someone was plotting against you or trying to hurt you?   No   Have you ever believed someone was reading your mind or could hear your thoughts or that you could actually read someone's mind or hear what another person was thinking?   No   Have you ever believed that someone of some force outside of yourself was putting thoughts into your mind or made you act in a way that was not your usual self?  Have you ever though you were possessed?   No   Have you ever believed you were being sent special messages through the TV, radio or newspaper?   No   Have you ever heard things other people couldn't hear, such as voices or other noises?   No   Have you ever had visions when you were awake?  Or have you ever seen things other people couldn't see?   No   Do you have a valid 's license?    Yes     PHQ-9, ULYSSES-7 and Suicide Risk Assessment   PHQ-9 on 11/9/2019 ULYSSES-7 on 11/9/2019   The patient's PHQ-9 score was none   The patient's ULYSSES-7 score was none       [place]-Suicide Severity Rating Scale   Suicide Ideation   1.) Have you ever wished you were dead or that you could go to sleep and not wake up?     Lifetime:  Yes   Past Month:  No     2.) Have you actually had any  thoughts of killing yourself?   Lifetime:  Yes- pt reported in college Past Month:  No     3.) Have you been thinking about how you might do this?     Lifetime:  No   Past Month:  No     4.) Have you had these thoughts and had some intention of acting on them?     Lifetime:  No   Past Month:  No     5.) Have you started to work out the details of how to kill yourself?   Lifetime:  No   Past Month:  No     6.) Do you intend to carry out this plan?      Lifetime:  No   Past Month:  No     Intensity of Ideation   Intensity of ideation (1 being least severe, 5 being most severe):     Lifetime:  2   Past Month:  The patient denied having any suicidal thoughts within the past month.     How often do you have these thoughts?  The patient denied having any suicidal thoughts within the past month.     When you have the thoughts how long do they last?  The patient denied having any suicidal thoughts within the past month.     Can you stop thinking about killing yourself or wanting to die if you want to?  The patient denied having any suicidal thoughts within the past month.     Are there things - anyone or anything (i.e. family, Latter-day, pain of death) that stopped you from wanting to die or acting on thoughts of suicide?  Does not apply     What sort of reasons did you have for thinking about wanting to die or killing yourself (ie end pain, stop how you were feeling, get attention or reaction, revenge)?  Does not apply     Suicidal Behavior   (Suicide Attempt) - Have you made a suicide attempt?     Lifetime:  The patient had never made a suicide attempt.   Past Month:  The patient had not made a suicide attempt within the past month.     Have you engaged in self-harm (non-suicidal self-injury)?  The patient denied having any history of engaging in self-harm (non-suicidal self-injury).     (Interrupted Attempt) - Has there been a time when you started to do something to end your life but someone or something stopped you  before you actually did anything?  No     (Aborted or Self-Interrupted Attempt) - Has there been a time when you started to do something to try to end your life but you stopped yourself before you actually did anything?  No     (Preparatory Acts of Behavior) - Have you taken any steps towards making suicide attempt or preparing to kill yourself (such as collecting pills, getting a gun, giving valuables away or writing a suicide note)?  No     Actual Lethality/Medical Damage:  The patient denied ever making a suicidal attempt.       2008  [employer] [facility]  Used with permission by Naima Chavarria, PhD.       Guide to C-SSRS Risk Ratings   NO IDEATION:  with no active thoughts IDEATION: with a wish to die. IDEATION: with active thoughts. Risk Ratings   If Yes No No 0 - Very Low Risk   If NA Yes No 1 - Low Risk   If NA Yes Yes 2 - Low/moderate risk   IDEATION: associated thoughts of methods without intent or plan INTENT: Intent to follow through on suicide PLAN: Plan to follow through on suicide Risk Ratings cont...   If Yes No No 3 - Moderate Risk   If Yes Yes No 4 - High Risk   If Yes Yes Yes 5 - High Risk   The patient's ADDITIONAL RISK FACTORS and lack of PROTECTIVE FACTORS may increase their overall suicide risk ratings.     Additional Risk Factors:    A recent loss that was significant to the patient, i.e. loss of job, loss of home, divorce, break-up, etc.   Protective Factors:    Having people in his/her life that would prevent the patient from considering a suicide attempt (i.e. young children, spouse, parents, etc.)     An absence of mental health issues or stable and well treated mental health issues     An absence of chronic health problems or stable and well treated chronic health issues     A positive relationship with his/her clinical medical and/or mental health providers     Risk Status   Past month: 0. - Very Low Risk:  Evaluation Counselors:  Document in Epic / SBAR to  "counselor \"Very Low Risk\".      Treatment Counselors:  Reassess upon admission as applicable, assess weekly in progress notes under Dimension 3 and summarize in Discharge / Treatment summary under Dimension 3.    Past 24 hours: 0. - Very Low Risk:  Evaluation Counselors:  Document in Epic / SBAR to counselor \"Very Low Risk\".      Treatment Counselors:  Reassess upon admission as applicable, assess weekly in progress notes under Dimension 3 and summarize in Discharge / Treatment summary under Dimension 3.   Additional information to support suicide risk rating: There was no additional information to provide at this time.     Mental Health Status   Physical Appearance/Attire: Appears stated age and Neat   Hygiene: well groomed   Eye Contact: at examiner   Speech Rate:  regular   Speech Volume: regular   Speech Quality: fluid   Cognitive/Perceptual:  reality based   Cognition: memory intact    Judgment: able to concentrate   Insight: able to concentrate   Orientation:  time, place, person and situation   Thought: concrete   Hallucinations:  none   General Behavioral Tone: cooperative   Psychomotor Activity: no problem noted   Gait:  no problem   Mood: appropriate   Affect: congruence/appropriate   Counselor Notes: NA     Criteria for Diagnosis: DSM-5 Criteria for Substance Use Disorders      Alcohol Use Disorder Severe - 303.90 (F10.20)  Cannabis Use Disorder Mild - 305.20 (F12.10) in early remission    Level of Care   I.) Intoxication and Withdrawal: 1   II.) Biomedical:  0   III.) Emotional and Behavioral:  2   IV.) Readiness to Change:  0   V.) Relapse Potential: 4   VI.) Recovery Environmental: 4     Initial Problem List     The patient lacks relapse prevention skills  The patient has poor coping skills  The patient lacks a sober peer support network    Patient/Client is willing to follow treatment recommendations.  Yes    Counselor: GARTH Garcia    Vulnerable Adult Checklist for LODGING:     This " LODGING patient, or other Residential/Lodging CD Treatment patient is a categorical Vulnerable Adult according to [place] Statute 626.5572 subdivision 21.    Susceptibility to abuse by others     1.  Have you ever been emotionally abused by anyone?          No    2.  Have you ever been bullied, or physically assaulted by anyone?        No    3.  Have you ever been sexually taken advantage of or sexually assaulted?        No    4.  Have you ever been financially taken advantage of?        No    5.  Have you ever hurt yourself intentionally such as burns or cuts?       No    Risk of abusing other vulnerable adults     1.  Have you ever bullied, berated or emotionally degraded someone else?       No    2.  Have you ever financially taken advantage of someone else?       No    3.  Have you ever sexually exploited or assaulted another person?       No    4.  Have you ever gotten into fights, verbal arguments or physically assaulted someone?          No    Based on the above information:    This [facility] patient, or other Residential/Lodging CD Treatment patient is a categorical Vulnerable Adult according to [place] Statue 626.5572 subdivision 21.                                                                                                                                                                                                       This person has a history of abuse, but is assessed as stable and not in need of an individual abuse prevention plan beyond the program abuse prevention plan.

## 2019-11-09 NOTE — PLAN OF CARE
A&O x4, CIWA score of 1. Tachycardic with mild temp, otherwise VSS. LS CTA all fields . BS active x4. alexx regular diet well. up with SBA.  Denies pain. voiding in good amts. Chem Dep consulted. Will continue to monitor.

## 2019-11-09 NOTE — PLAN OF CARE
Pt A&O x4. Scoring 2 on CIWA for headache and tremors. VS stable; low-grade temp-encouraged use of IS. Rates pain 1/10 in lower abdomen-declines intervention. CMS intact. Up independently in room and walking the halls. Voiding in good amts. Tolerating regular diet. Plan is for possible discharge to home tomorrow. Will continue to monitor.

## 2019-11-09 NOTE — PROGRESS NOTES
.        [facility]  Hospitalist Progress Note  Jourdan Wilson MD 11/09/2019    Reason for Stay        Alcohol withdrawal syndrome         Assessment and Plan:        Summary of Stay: 29-year-old male with history of alcoholism over the last few years who has previous history of alcohol withdrawals admitted with alcohol withdrawal syndrome.  He developed a unwitnessed seizure on admission and was treated in ICU with IV Precedex as well as benzodiazepines.  Patient improved and transferred to medical floor for close monitoring and chemical dependency evaluation      Problem List with Assessment and plan      1. Alcohol withdrawal syndrome with alcohol withdrawal seizures: Currently stable, getting clonidine and gabapentin as well as PRN lorazepam.  Continue current regiment, continue to monitor symptoms.    2. Alcoholism and alcohol use disorder: Patient admits to drinking 650 cc of schnapps hard liquor.  Never been in rehab before and appears to be determined to consider treatment.  Will assist him to get more information and get treatment either a detox facility or outpatient.  Social service consulted as well as CD provider.    3. Abdominal pain: Improving this morning.  I will add LFT to blood test already drawn.  I suspect he has acute gastritis related to alcohol use.        LDA/Indwelling devices:    Peripheral IV 11/07/19 Right Upper forearm;Lower forearm (Active)   Site Assessment [facility] 11/9/2019  8:44 AM   Line Status Infusing 11/9/2019  8:44 AM   Phlebitis Scale 0-->no symptoms 11/9/2019  8:44 AM   Infiltration Scale 0 11/9/2019  8:44 AM   Infiltration Site Treatment Method  None 11/9/2019  8:44 AM   Extravasation? No 11/9/2019  8:44 AM   Number of days: 2       Peripheral IV 11/07/19 Right Hand (Active)   Site Assessment [facility] 11/9/2019  8:44 AM   Line Status Saline locked 11/9/2019  8:44 AM   Phlebitis Scale 0-->no symptoms 11/9/2019  8:44 AM   Infiltration Scale 0 11/9/2019  8:44 AM  "  Infiltration Site Treatment Method  None 11/9/2019  8:44 AM   Extravasation? No 11/9/2019  8:44 AM   Number of days: 2            Deluna Catheter: not present        FEN :    Orders Placed This Encounter      Combination Diet Regular Diet Adult           Intake/Output Summary (Last 24 hours) at 11/9/2019 1115  Last data filed at 11/9/2019 0708  Gross per 24 hour   Intake 2713 ml   Output 2850 ml   Net -137 ml          DVT Prophylaxis: Ambulate every shift    Code Status: Full Code    Discharge Disposition: Home in the next 1 day if he remains stable.  I consulted social service to provide outpatient treatment information if CD consultation is not completed          Interval History (Subjective):        Patient is seen and examined by me today and medical record reviewed.Overnight events noted and care discussed with nursing staff.  Feels better today.  Less shaky.  No seizure episodes.  He is doing well and appears to be determined to consider treatment for his cardiac disorder.           Physical Exam:        Last Vital Signs:  BP (!) 128/95 (BP Location: Left arm)   Pulse 102   Temp 98.3  F (36.8  C) (Oral)   Resp 14   Ht 1.88 m (6' 2\")   Wt 97.8 kg (215 lb 9.8 oz)   SpO2 97%   BMI 27.68 kg/m        Intake/Output Summary (Last 24 hours) at 11/9/2019 1119  Last data filed at 11/9/2019 0708  Gross per 24 hour   Intake 2713 ml   Output 2850 ml   Net -137 ml       Wt Readings from Last 5 Encounters:   11/08/19 97.8 kg (215 lb 9.8 oz)        Constitutional: Awake, alert, cooperative, no apparent distress     Respiratory: Clear to auscultation bilaterally, no crackles or wheezing   Cardiovascular: Regular rate and rhythm, normal S1 and S2, and no murmur noted   Abdomen: Normal bowel sounds, soft, non-distended, non-tender   Skin: No rashes, no cyanosis, dry to touch   Neuro: Alert with  no weakness, numbness, memory loss   Extremities: No edema, normal range of motion   Other(s):        All other systems: " Negative          Medications:        All current medications were reviewed with changes reflected in problem list.         Data:      All new lab and imaging data was reviewed.      Data reviewed today: I reviewed all new labs and imaging results over the last 24 hours. I personally reviewed no images or EKG's today      Recent Labs   Lab 11/07/19  1209   WBC 8.6   HGB 16.6   HCT 47.8   MCV 95        No results for input(s): CULT in the last 168 hours.  Recent Labs   Lab 11/09/19  0637 11/08/19  0529 11/07/19  1209    139 136   POTASSIUM 5.5* 4.1 3.6   CHLORIDE 107 106 99   CO2 20 28 26   ANIONGAP 11 5 11   GLC 76 97 104*   BUN 9 9 8   CR 0.88 1.04 0.95   GFRESTIMATED >90 >90 >90   GFRESTBLACK >90 >90 >90   MAYNOR 9.1 9.2 9.1   MAG  --   --  1.7   PROTTOTAL  --   --  7.4   ALBUMIN  --   --  4.4   BILITOTAL  --   --  1.2   ALKPHOS  --   --  51   AST  --   --  122*   ALT  --   --  128*       Recent Labs   Lab 11/09/19  0637 11/08/19  1153 11/08/19  0758 11/08/19  0529 11/08/19  0405 11/07/19  2347 11/07/19  1957  11/07/19  1209   GLC 76  --   --  97  --   --   --   --  104*   BGM  --  82 98  --  87 97 97   < >  --     < > = values in this interval not displayed.       Recent Labs   Lab 11/07/19  1209   INR 0.95         No results for input(s): TROPONIN, TROPI, TROPR in the last 168 hours.    Invalid input(s): TROP, TROPONINIES    No results found for this or any previous visit (from the past 48 hour(s)).      Disclaimer: This note consists of symbols derived from keyboarding, dictation and/or voice recognition software. As a result, there may be errors in the script that have gone undetected. Please consider this when interpreting information found in this chart.

## 2019-11-09 NOTE — PROGRESS NOTES
Cross cover    Pt hypertensive and tachy.  Admitted with Etoh withdrawal.    Will add Clonidine for now. Primary team to stop this if they disagree.     KP

## 2019-11-10 ENCOUNTER — APPOINTMENT (OUTPATIENT)
Dept: CT IMAGING | Facility: CLINIC | Age: 29
End: 2019-11-10
Attending: INTERNAL MEDICINE
Payer: COMMERCIAL

## 2019-11-10 LAB
ALBUMIN SERPL-MCNC: 3.2 G/DL (ref 3.4–5)
ALP SERPL-CCNC: 87 U/L (ref 40–150)
ALT SERPL W P-5'-P-CCNC: 244 U/L (ref 0–70)
ANION GAP SERPL CALCULATED.3IONS-SCNC: 7 MMOL/L (ref 3–14)
AST SERPL W P-5'-P-CCNC: 267 U/L (ref 0–45)
BILIRUB SERPL-MCNC: 1.3 MG/DL (ref 0.2–1.3)
BUN SERPL-MCNC: 9 MG/DL (ref 7–30)
CALCIUM SERPL-MCNC: 9 MG/DL (ref 8.5–10.1)
CHLORIDE SERPL-SCNC: 105 MMOL/L (ref 94–109)
CO2 SERPL-SCNC: 27 MMOL/L (ref 20–32)
CREAT SERPL-MCNC: 0.86 MG/DL (ref 0.66–1.25)
ERYTHROCYTE [DISTWIDTH] IN BLOOD BY AUTOMATED COUNT: 11.9 % (ref 10–15)
GFR SERPL CREATININE-BSD FRML MDRD: >90 ML/MIN/{1.73_M2}
GLUCOSE SERPL-MCNC: 73 MG/DL (ref 70–99)
HCT VFR BLD AUTO: 43.3 % (ref 40–53)
HGB BLD-MCNC: 14.8 G/DL (ref 13.3–17.7)
LIPASE SERPL-CCNC: 4456 U/L (ref 73–393)
MCH RBC QN AUTO: 33.2 PG (ref 26.5–33)
MCHC RBC AUTO-ENTMCNC: 34.2 G/DL (ref 31.5–36.5)
MCV RBC AUTO: 97 FL (ref 78–100)
PLATELET # BLD AUTO: 121 10E9/L (ref 150–450)
POTASSIUM SERPL-SCNC: 3.8 MMOL/L (ref 3.4–5.3)
PROT SERPL-MCNC: 6.8 G/DL (ref 6.8–8.8)
RBC # BLD AUTO: 4.46 10E12/L (ref 4.4–5.9)
SODIUM SERPL-SCNC: 139 MMOL/L (ref 133–144)
WBC # BLD AUTO: 6.4 10E9/L (ref 4–11)

## 2019-11-10 PROCEDURE — 80053 COMPREHEN METABOLIC PANEL: CPT | Performed by: HOSPITALIST

## 2019-11-10 PROCEDURE — 25000125 ZZHC RX 250: Performed by: HOSPITALIST

## 2019-11-10 PROCEDURE — 25000132 ZZH RX MED GY IP 250 OP 250 PS 637: Performed by: INTERNAL MEDICINE

## 2019-11-10 PROCEDURE — 25000132 ZZH RX MED GY IP 250 OP 250 PS 637

## 2019-11-10 PROCEDURE — 12000000 ZZH R&B MED SURG/OB

## 2019-11-10 PROCEDURE — 99232 SBSQ HOSP IP/OBS MODERATE 35: CPT | Performed by: INTERNAL MEDICINE

## 2019-11-10 PROCEDURE — 25000128 H RX IP 250 OP 636: Performed by: HOSPITALIST

## 2019-11-10 PROCEDURE — 25000128 H RX IP 250 OP 636: Performed by: PHYSICIAN ASSISTANT

## 2019-11-10 PROCEDURE — 74177 CT ABD & PELVIS W/CONTRAST: CPT

## 2019-11-10 PROCEDURE — 36415 COLL VENOUS BLD VENIPUNCTURE: CPT | Performed by: HOSPITALIST

## 2019-11-10 PROCEDURE — 71260 CT THORAX DX C+: CPT

## 2019-11-10 PROCEDURE — 83690 ASSAY OF LIPASE: CPT | Performed by: HOSPITALIST

## 2019-11-10 PROCEDURE — 99207 ZZC CDG-MDM COMPONENT: MEETS LOW - DOWN CODED: CPT | Performed by: INTERNAL MEDICINE

## 2019-11-10 PROCEDURE — 25800030 ZZH RX IP 258 OP 636: Performed by: INTERNAL MEDICINE

## 2019-11-10 PROCEDURE — 25000132 ZZH RX MED GY IP 250 OP 250 PS 637: Performed by: PHYSICIAN ASSISTANT

## 2019-11-10 PROCEDURE — 85027 COMPLETE CBC AUTOMATED: CPT | Performed by: HOSPITALIST

## 2019-11-10 PROCEDURE — 25000132 ZZH RX MED GY IP 250 OP 250 PS 637: Performed by: HOSPITALIST

## 2019-11-10 RX ORDER — IOPAMIDOL 755 MG/ML
500 INJECTION, SOLUTION INTRAVASCULAR ONCE
Status: COMPLETED | OUTPATIENT
Start: 2019-11-10 | End: 2019-11-10

## 2019-11-10 RX ADMIN — SODIUM CHLORIDE: 9 INJECTION, SOLUTION INTRAVENOUS at 00:14

## 2019-11-10 RX ADMIN — POTASSIUM CHLORIDE 20 MEQ: 1500 TABLET, EXTENDED RELEASE ORAL at 09:30

## 2019-11-10 RX ADMIN — GABAPENTIN 800 MG: 400 CAPSULE ORAL at 00:15

## 2019-11-10 RX ADMIN — FAMOTIDINE 20 MG: 40 POWDER, FOR SUSPENSION ORAL at 09:32

## 2019-11-10 RX ADMIN — ENOXAPARIN SODIUM 40 MG: 40 INJECTION SUBCUTANEOUS at 16:32

## 2019-11-10 RX ADMIN — Medication 100 MG: at 09:31

## 2019-11-10 RX ADMIN — CLONIDINE HYDROCHLORIDE 0.1 MG: 0.1 TABLET ORAL at 09:30

## 2019-11-10 RX ADMIN — SIMETHICONE CHEW TAB 80 MG 80 MG: 80 TABLET ORAL at 00:22

## 2019-11-10 RX ADMIN — CLONIDINE HYDROCHLORIDE 0.1 MG: 0.1 TABLET ORAL at 19:46

## 2019-11-10 RX ADMIN — IOPAMIDOL 100 ML: 755 INJECTION, SOLUTION INTRAVENOUS at 09:14

## 2019-11-10 RX ADMIN — SODIUM CHLORIDE 65 ML: 9 INJECTION, SOLUTION INTRAVENOUS at 09:14

## 2019-11-10 RX ADMIN — GABAPENTIN 800 MG: 400 CAPSULE ORAL at 16:32

## 2019-11-10 RX ADMIN — GABAPENTIN 800 MG: 400 CAPSULE ORAL at 09:30

## 2019-11-10 RX ADMIN — MULTIPLE VITAMINS W/ MINERALS TAB 1 TABLET: TAB at 09:31

## 2019-11-10 RX ADMIN — SODIUM CHLORIDE: 9 INJECTION, SOLUTION INTRAVENOUS at 19:48

## 2019-11-10 RX ADMIN — FOLIC ACID 1 MG: 1 TABLET ORAL at 09:31

## 2019-11-10 RX ADMIN — SODIUM CHLORIDE: 9 INJECTION, SOLUTION INTRAVENOUS at 10:46

## 2019-11-10 ASSESSMENT — ACTIVITIES OF DAILY LIVING (ADL)
ADLS_ACUITY_SCORE: 10

## 2019-11-10 NOTE — PLAN OF CARE
Sleeping comfortably throughout the night. VSS: NPO. Tele: SR/ST w/PVC's(80 to 100). CIWA 2 this shift.complains boating, simethicone given with relief. Independently ambulating in the room. Voiding. Nursing continue to monitor.

## 2019-11-10 NOTE — PROVIDER NOTIFICATION
"Text page sent to admitting hospitalist:    \"Pt c/o gradual increasing lower abdominal pain, reports several loose stools today. Reports tenderness, bloating and endorses distention. Please assist.\"    Addendum:    New orders placed for stat AXR, NPO and IVF. Simethicone ordered for discomfort. AXR completed, see results. Spoke on phone with Dr. Smith, instructed to keep pt NPO overnight with meds, initiate IVF.  "

## 2019-11-10 NOTE — PLAN OF CARE
Pt A&O x4. Temp of 100. Remains tachycardic. Up ind. Voiding. Bm this evening. Reg diet. Reports lower abdominal pain. Scored 1 on CIWA for slight tremors. Plan for home tomorrow.

## 2019-11-10 NOTE — PLAN OF CARE
Pt A&O x4. Scoring 2/10 on CIWA for mild tremor and headache. VS stable; afebrile. K 3.8 today-replaced; re-check in the AM. Rates pain in abdomen 2/10-declines intervention. Had CT today-now remains NPO. Fluids infusing. CMS intact. Up independently in room. Voiding in good amts; having loose stools. Plan is home @ discharge. Will continue to monitor.

## 2019-11-10 NOTE — PLAN OF CARE
[id number]    BP remains elevated, scheduled meds given; other VSS on RA. C/O 2/10 abdominal pain which pt reports has been increasing; endorses bloating, tenderness, see corresponding notes. AXR completed, spoke with Dr. Smith via telephone and instructed to keep pt NPO with meds and IVF overnight. CIWA 4, no indications for intervention. Pt reports 3 loose BM today, reports passing frequent gas. Voiding without saving this shift. Tele in place, ST. Ambulating in room ad jonas. Alert and oriented, able to make needs known. Continue to monitor.

## 2019-11-10 NOTE — PROGRESS NOTES
.        [facility]  Hospitalist Progress Note  Jourdan Wilson MD 11/10/2019    Reason for Stay        Alcohol withdrawal syndrome         Assessment and Plan:        Summary of Stay: 29-year-old male with history of alcoholism over the last few years who has previous history of alcohol withdrawals admitted with alcohol withdrawal syndrome.  He developed a unwitnessed seizure on admission and was treated in ICU with IV Precedex as well as benzodiazepines.  Patient improved and transferred to medical floor for close monitoring and chemical dependency evaluation      Problem List with Assessment and plan      1. Alcohol withdrawal syndrome with alcohol withdrawal seizures: Currently stable, getting clonidine and gabapentin as well as PRN lorazepam.  Continue current regiment, continue to monitor symptoms.    2. Alcoholism and alcohol use disorder: Patient admits to drinking 650 cc of schnapps hard liquor.  Never been in rehab before and appears to be determined to consider treatment.  Will assist him to get more information and get treatment either a detox facility or outpatient.  Social service consulted as well as CD provider.    3. Abdominal pain: Abdominal pain is diffuse and more lower abdomen has been persisting.  X-ray showed no evidence of bowel obstruction.  I will get CT scan of the abdomen for further evaluation.  Pain control, keep him n.p.o. until CT is obtained.  Addendum  CT scan of the abdomen showed mild dell-pancreatic fat stranding and edema suspicious for acute pancreatitis.  He has diffuse findings from the liver as well.  We will keep him n.p.o., continue pain medications, and lipase to the lab.      LDA/Indwelling devices:    Peripheral IV 11/07/19 Right Upper forearm;Lower forearm (Active)   Site Assessment WDL 11/10/2019  3:00 AM   Line Status Saline locked 11/10/2019  3:00 AM   Phlebitis Scale 0-->no symptoms 11/10/2019  3:00 AM   Infiltration Scale 0 11/10/2019  3:00 AM  "  Infiltration Site Treatment Method  None 11/10/2019  3:00 AM   Extravasation? No 11/10/2019  3:00 AM   Number of days: 3       Peripheral IV 11/07/19 Right Hand (Active)   Site Assessment WDL 11/10/2019  3:00 AM   Line Status Saline locked 11/10/2019  3:00 AM   Phlebitis Scale 0-->no symptoms 11/10/2019  3:00 AM   Infiltration Scale 0 11/10/2019  3:00 AM   Infiltration Site Treatment Method  None 11/10/2019  3:00 AM   Extravasation? No 11/10/2019  3:00 AM   Number of days: 3            Deluna Catheter: not present        FEN :    Orders Placed This Encounter      NPO for Medical/Clinical Reasons Except for: Meds, Ice Chips           Intake/Output Summary (Last 24 hours) at 11/9/2019 1115  Last data filed at 11/9/2019 0708  Gross per 24 hour   Intake 2713 ml   Output 2850 ml   Net -137 ml          DVT Prophylaxis: Ambulate every shift    Code Status: Full Code    Discharge Disposition: Determined.  Likely in the next 1 day if his abdominal pain improves        Interval History (Subjective):        Patient is seen and examined by me today and medical record reviewed.Overnight events noted and care discussed with nursing staff.  Feels better and has no nausea or vomiting.  No diarrhea.  He has abdominal pain which is more in the lower abdomen.       Physical Exam:        Last Vital Signs:  BP (!) 144/86 (BP Location: Left arm)   Pulse 95   Temp 98.5  F (36.9  C) (Oral)   Resp 16   Ht 1.88 m (6' 2\")   Wt 97.8 kg (215 lb 9.8 oz)   SpO2 100%   BMI 27.68 kg/m        Intake/Output Summary (Last 24 hours) at 11/9/2019 1119  Last data filed at 11/9/2019 0708  Gross per 24 hour   Intake 2713 ml   Output 2850 ml   Net -137 ml       Wt Readings from Last 5 Encounters:   11/08/19 97.8 kg (215 lb 9.8 oz)        Constitutional: Awake, alert, cooperative, no apparent distress     Respiratory: Clear to auscultation bilaterally, no crackles or wheezing   Cardiovascular: Regular rate and rhythm, normal S1 and S2, and no murmur " noted   Abdomen:  Abdomen is soft, tenderness in lower abdomen.  No mass   Skin: No rashes, no cyanosis, dry to touch   Neuro: Alert with  no weakness, numbness, memory loss   Extremities: No edema, normal range of motion   Other(s):        All other systems: Negative          Medications:        All current medications were reviewed with changes reflected in problem list.         Data:      All new lab and imaging data was reviewed.      Data reviewed today: I reviewed all new labs and imaging results over the last 24 hours. I personally reviewed no images or EKG's today      Recent Labs   Lab 11/10/19  0654 11/07/19  1209   WBC 6.4 8.6   HGB 14.8 16.6   HCT 43.3 47.8   MCV 97 95   * 180     No results for input(s): CULT in the last 168 hours.  Recent Labs   Lab 11/10/19  0654 11/09/19  1229 11/09/19  0637 11/08/19  0529 11/07/19  1209     --  138 139 136   POTASSIUM 3.8  --  5.5* 4.1 3.6   CHLORIDE 105  --  107 106 99   CO2 27  --  20 28 26   ANIONGAP 7  --  11 5 11   GLC 73  --  76 97 104*   BUN 9  --  9 9 8   CR 0.86  --  0.88 1.04 0.95   GFRESTIMATED >90  --  >90 >90 >90   GFRESTBLACK >90  --  >90 >90 >90   MAYNOR 9.0  --  9.1 9.2 9.1   MAG  --   --   --   --  1.7   PROTTOTAL 6.8 6.8  --   --  7.4   ALBUMIN 3.2* 3.4  --   --  4.4   BILITOTAL 1.3 1.4*  --   --  1.2   ALKPHOS 87 91  --   --  51   * 796*  --   --  122*   * 347*  --   --  128*       Recent Labs   Lab 11/10/19  0654 11/09/19  0637 11/08/19  1153 11/08/19  0758 11/08/19  0529 11/08/19  0405 11/07/19  2347 11/07/19  1957  11/07/19  1209   GLC 73 76  --   --  97  --   --   --   --  104*   BGM  --   --  82 98  --  87 97 97   < >  --     < > = values in this interval not displayed.       Recent Labs   Lab 11/07/19  1209   INR 0.95         No results for input(s): TROPONIN, TROPI, TROPR in the last 168 hours.    Invalid input(s): TROP, TROPONINIES    No results found for this or any previous visit (from the past 48  hour(s)).      Disclaimer: This note consists of symbols derived from keyboarding, dictation and/or voice recognition software. As a result, there may be errors in the script that have gone undetected. Please consider this when interpreting information found in this chart.

## 2019-11-11 VITALS
DIASTOLIC BLOOD PRESSURE: 86 MMHG | SYSTOLIC BLOOD PRESSURE: 142 MMHG | BODY MASS INDEX: 27.67 KG/M2 | OXYGEN SATURATION: 100 % | HEIGHT: 74 IN | WEIGHT: 215.61 LBS | HEART RATE: 83 BPM | TEMPERATURE: 98 F | RESPIRATION RATE: 14 BRPM

## 2019-11-11 LAB
ERYTHROCYTE [DISTWIDTH] IN BLOOD BY AUTOMATED COUNT: 11.9 % (ref 10–15)
HCT VFR BLD AUTO: 41.2 % (ref 40–53)
HGB BLD-MCNC: 14.3 G/DL (ref 13.3–17.7)
MCH RBC QN AUTO: 33.9 PG (ref 26.5–33)
MCHC RBC AUTO-ENTMCNC: 34.7 G/DL (ref 31.5–36.5)
MCV RBC AUTO: 98 FL (ref 78–100)
PLATELET # BLD AUTO: 118 10E9/L (ref 150–450)
POTASSIUM SERPL-SCNC: 4 MMOL/L (ref 3.4–5.3)
RBC # BLD AUTO: 4.22 10E12/L (ref 4.4–5.9)
WBC # BLD AUTO: 6.9 10E9/L (ref 4–11)

## 2019-11-11 PROCEDURE — 25000132 ZZH RX MED GY IP 250 OP 250 PS 637

## 2019-11-11 PROCEDURE — 36415 COLL VENOUS BLD VENIPUNCTURE: CPT | Performed by: HOSPITALIST

## 2019-11-11 PROCEDURE — 25000132 ZZH RX MED GY IP 250 OP 250 PS 637: Performed by: PHYSICIAN ASSISTANT

## 2019-11-11 PROCEDURE — 25800030 ZZH RX IP 258 OP 636: Performed by: INTERNAL MEDICINE

## 2019-11-11 PROCEDURE — 25000132 ZZH RX MED GY IP 250 OP 250 PS 637: Performed by: HOSPITALIST

## 2019-11-11 PROCEDURE — 85027 COMPLETE CBC AUTOMATED: CPT | Performed by: HOSPITALIST

## 2019-11-11 PROCEDURE — 99238 HOSP IP/OBS DSCHRG MGMT 30/<: CPT | Performed by: INTERNAL MEDICINE

## 2019-11-11 PROCEDURE — 84132 ASSAY OF SERUM POTASSIUM: CPT | Performed by: HOSPITALIST

## 2019-11-11 RX ORDER — LANOLIN ALCOHOL/MO/W.PET/CERES
100 CREAM (GRAM) TOPICAL DAILY
Qty: 30 TABLET | Refills: 0 | Status: SHIPPED | OUTPATIENT
Start: 2019-11-12

## 2019-11-11 RX ORDER — FOLIC ACID 1 MG/1
1 TABLET ORAL DAILY
Qty: 30 TABLET | Refills: 0 | Status: SHIPPED | OUTPATIENT
Start: 2019-11-12

## 2019-11-11 RX ORDER — FOLIC ACID 1 MG/1
1 TABLET ORAL DAILY
Qty: 30 TABLET | Refills: 0 | Status: SHIPPED | OUTPATIENT
Start: 2019-11-12 | End: 2019-11-11

## 2019-11-11 RX ADMIN — FOLIC ACID 1 MG: 1 TABLET ORAL at 09:01

## 2019-11-11 RX ADMIN — MULTIPLE VITAMINS W/ MINERALS TAB 1 TABLET: TAB at 09:01

## 2019-11-11 RX ADMIN — SODIUM CHLORIDE: 9 INJECTION, SOLUTION INTRAVENOUS at 04:56

## 2019-11-11 RX ADMIN — POTASSIUM CHLORIDE 20 MEQ: 1500 TABLET, EXTENDED RELEASE ORAL at 09:00

## 2019-11-11 RX ADMIN — GABAPENTIN 600 MG: 300 CAPSULE ORAL at 00:09

## 2019-11-11 RX ADMIN — Medication 100 MG: at 09:00

## 2019-11-11 ASSESSMENT — ACTIVITIES OF DAILY LIVING (ADL)
ADLS_ACUITY_SCORE: 10

## 2019-11-11 NOTE — DISCHARGE SUMMARY
[facility]  Hospitalist Discharge Summary       Date of Admission:  11/7/2019  Date of Discharge:  11/11/2019  2:25 PM  Discharging Provider: Kam Diaz MD      Discharge Diagnoses   Alcohol dependence with withdrawal  Alcoholic pancreatitis    Follow-ups Needed After Discharge   Follow-up Appointments     Follow-up and recommended labs and tests       Follow up with primary care provider, Kenneth G. Pallas, within 1-2 weeks   for hospital follow up.             Discharge Disposition   Discharged to home  Condition at discharge: Stable    Hospital Course     Alcohol use disorder with dependence with alcohol withdrawal seizure  Patient reported drinking 600 cc hard liquor daily prior to admission.  Reported having had withdrawal symptoms when attempting to quit drinking in the past.  Presented to the ED after he had some auditory hallucinations.  There is some concern he had a alcohol withdrawal seizure.  He was initially admitted to the ICU given need for Precedex drip but this was quickly weaned.  He was started on scheduled gabapentin.  He improved quite quickly and was transferred to the floor, where he needed no further Ativan.  He was put in touch with chemical dependency and the patient plans to pursue rehab in the outpatient setting.    Acute alcoholic pancreatitis  He complained of diffuse abdominal pain.  CT scan was consistent with pancreatitis, as was elevated lipase of over 4000.  His symptoms improved with bowel rest.  On the day of discharge, he had tolerated clear and full liquid diet without any complaints and was ready for discharge to home.  He will eat a low-fat diet at home.      Consultations This Hospital Stay   SOCIAL WORK IP CONSULT  CHEMICAL DEPENDENCY IP CONSULT  SOCIAL WORK IP CONSULT    Code Status   Full Code    Time Spent on this Encounter   I, Kam Diaz MD, personally saw the patient today and spent less than or equal to 30 minutes discharging  this patient.       Kam Diaz MD  [facility]  ______________________________________________________________________    Physical Exam   Vital Signs: Temp: 98  F (36.7  C) Temp src: Oral BP: (!) 142/86 Pulse: 83 Heart Rate: 74 Resp: 14 SpO2: 100 % O2 Device: None (Room air)    Weight: 215 lbs 9.76 oz    General: Appears well, no acute distress.     HEENT: No scleral icterus. Oropharynx moist.     Neck: Supple. Normal range of motion.     Pulmonary: Normal work of breathing. Clear to auscultation bilaterally.    Cardiovascular: Regular rate and rhythm without murmur or extra heart sounds.    Abdomen: Soft and non-tender.    Extremities: No peripheral edema. No clubbing or cyanosis.     Neurologic: Awake, alert, appropriate.    Skin: Warm and dry.    Psychiatric: Normal affect and mood.           Primary Care Physician   Kenneth G. Pallas    Discharge Orders      Reason for your hospital stay    You were admitted for alcohol withdrawal, which improved after the appropriate medications were given. We then found that your abdominal pain was from pancreatitis, which is related to the alcohol use.     Follow-up and recommended labs and tests     Follow up with primary care provider, Kenneth G. Pallas, within 1-2 weeks for hospital follow up.     Activity    Your activity upon discharge: activity as tolerated     Diet    Follow this diet upon discharge: Low Fat       Significant Results and Procedures   Results for orders placed or performed during the hospital encounter of 11/07/19   XR Abdomen 2 Views    Narrative    EXAM: XR ABDOMEN 2 VW  LOCATION: [facility]  DATE/TIME: 11/9/2019 10:25 PM    INDICATION: Abdominal pain.  COMPARISON: 11/14/2017      Impression    IMPRESSION: Moderate gaseous distention of the stomach. There are a few borderline dilated loops of air-filled small bowel, otherwise the small bowel and colon are of normal caliber. No definite findings for obstruction.  No free air. No evidence for   renal stones.    CT Chest/Abdomen/Pelvis w Contrast    Narrative    CT CHEST/ABDOMEN/PELVIS WITH CONTRAST  11/10/2019 9:25 AM    HISTORY: Sepsis.    TECHNIQUE: CT scan obtained of the chest, abdomen, and pelvis with  oral and IV contrast. 100mL Isovue-370 IV injected. Radiation dose for  this scan was reduced using automated exposure control, adjustment of  the mA and/or kV according to patient size, or iterative  reconstruction technique.    COMPARISON: None.    FINDINGS:  Chest: The visualized portions of the thyroid gland are unremarkable.    No supraclavicular, axillary, mediastinal or hilar lymphadenopathy  seen.    Heart size is normal. No pericardial effusion is seen. Pulmonary trunk  and thoracic aorta are of normal course and caliber. Tiny foci of air  noted within the pulmonary trunk measuring 4 mm in diameter.    No pleural effusion or pneumothorax is seen. Subsegmental atelectasis  seen in the bilateral lower lobes.    Abdomen/pelvis: Diffuse fatty infiltration seen in the liver. No  intrahepatic or extrahepatic biliary ductal dilatation present.  Gallbladder is contracted.    Spleen and adrenal glands are unremarkable. Mild peripancreatic fat  stranding is present, particularly around the body and tail (series 2,  image 58). Fat stranding and mild edema extends inferiorly into the  pelvis. No pancreatic ductal dilatation is present.    Kidneys enhance symmetrically. No hydronephrosis is seen.    Stomach is moderately distended with ingested contents and air. Small  and large bowel loops are nondilated. The appendix is normal.    Abdominal aorta and IVC are of normal course and caliber. No  retroperitoneal, mesenteric or pelvic lymphadenopathy seen.    Urinary bladder is mildly distended. Prostate gland is not enlarged.  Small fat-containing right inguinal hernia.    No suspicious osseous lesion seen.      Impression    IMPRESSION:   1.  Mild peripancreatic fat stranding  and edema, particularly around  the pancreatic body and tail, and continuing inferiorly through the  retroperitoneum and into the pelvis. Findings suspicious for acute  pancreatitis. Correlate with laboratory values.  2.  Tiny foci of air within the pulmonary trunk, possibly iatrogenic.  3.  Diffuse fatty infiltration of the liver.    MILTON PLUMMER MD     Most Recent 3 CBC's:  Recent Labs   Lab Test 11/11/19  0636 11/10/19  0654 11/07/19  1209   WBC 6.9 6.4 8.6   HGB 14.3 14.8 16.6   MCV 98 97 95   * 121* 180      Most Recent 3 BMP's:  Recent Labs   Lab Test 11/11/19  0636 11/10/19  0654 11/09/19  0637 11/08/19  0529   NA  --  139 138 139   POTASSIUM 4.0 3.8 5.5* 4.1   CHLORIDE  --  105 107 106   CO2  --  27 20 28   BUN  --  9 9 9   CR  --  0.86 0.88 1.04   ANIONGAP  --  7 11 5   MAYNOR  --  9.0 9.1 9.2   GLC  --  73 76 97     Most Recent 2 LFT's:  Recent Labs   Lab Test 11/10/19  0654 11/09/19  1229   * 796*   * 347*   ALKPHOS 87 91   BILITOTAL 1.3 1.4*     Most Recent INR's and Anticoagulation Dosing History:  Anticoagulation Dose History     Recent Dosing and Labs Latest Ref Rng & Units 11/7/2019    INR 0.86 - 1.14 0.95           Discharge Medications   Discharge Medication List as of 11/11/2019  2:12 PM      START taking these medications    Details   vitamin B1 (THIAMINE) 100 MG tablet Take 1 tablet (100 mg) by mouth daily, Disp-30 tablet, R-0, E-Prescribe         CONTINUE these medications which have CHANGED    Details   folic acid (FOLVITE) 1 MG tablet Take 1 tablet (1 mg) by mouth daily, Disp-30 tablet, R-0, E-Prescribe           Allergies   No Known Allergies

## 2019-11-11 NOTE — PLAN OF CARE
Pt A&O x4. Scoring 0 on CIWA. VS stable; afebrile. K 4.0 today-replaced. Denies pain. CMS intact. Up independently in room. Voiding in good amts. Advanced to clears and full liquids-tolerated well.     Reviewed discharge instructions and medications with patient. Questions answered. Patient discharged to home via parents with, discharge instructions, and belongings at this time.

## 2019-11-11 NOTE — PLAN OF CARE
Pt A&O x4. VSS. Tele. Up ind. NPO except ice chips/meds. Anxious to eat. Mild abdominal pain, declines intervention. IVF running @ 100 ml/hr. Scoring 0-3 on CIWA scale for dull headache and tremors. Will continue to monitor.

## 2019-11-11 NOTE — PLAN OF CARE
A&Ox4.Tele: SR PACs 70s.Sleeping comfortably  throughout the night.  VSS: NPO, except ice chips. Denies pain. Independent in the room. CIWA 0-2 scoring this shift. Voiding edequetely Nursing continue to monitor.

## 2019-11-18 ENCOUNTER — HOSPITAL ENCOUNTER (OUTPATIENT)
Dept: BEHAVIORAL HEALTH | Facility: CLINIC | Age: 29
End: 2019-11-18
Attending: FAMILY MEDICINE
Payer: COMMERCIAL

## 2019-11-18 VITALS
HEART RATE: 80 BPM | HEIGHT: 74 IN | BODY MASS INDEX: 28.23 KG/M2 | WEIGHT: 220 LBS | DIASTOLIC BLOOD PRESSURE: 97 MMHG | SYSTOLIC BLOOD PRESSURE: 142 MMHG

## 2019-11-18 PROBLEM — F19.20 CHEMICAL DEPENDENCY (H): Status: ACTIVE | Noted: 2019-11-18

## 2019-11-18 PROCEDURE — H2035 A/D TX PROGRAM, PER HOUR: HCPCS

## 2019-11-18 PROCEDURE — 10020000 ZZH LODGING PLUS FACILITY CHARGE ADULT

## 2019-11-18 PROCEDURE — H2035 A/D TX PROGRAM, PER HOUR: HCPCS | Mod: HQ

## 2019-11-18 RX ORDER — ACETAMINOPHEN 325 MG/1
650 TABLET ORAL EVERY 4 HOURS PRN
COMMUNITY
End: 2019-12-14

## 2019-11-18 RX ORDER — MAGNESIUM HYDROXIDE/ALUMINUM HYDROXICE/SIMETHICONE 120; 1200; 1200 MG/30ML; MG/30ML; MG/30ML
30 SUSPENSION ORAL EVERY 6 HOURS PRN
COMMUNITY
End: 2019-12-14

## 2019-11-18 RX ORDER — IBUPROFEN 200 MG
400 TABLET ORAL EVERY 6 HOURS PRN
COMMUNITY
End: 2019-12-14

## 2019-11-18 RX ORDER — LORATADINE 10 MG/1
10 TABLET ORAL DAILY PRN
COMMUNITY
End: 2019-12-14

## 2019-11-18 RX ORDER — AMOXICILLIN 250 MG
2 CAPSULE ORAL DAILY PRN
COMMUNITY
End: 2019-12-14

## 2019-11-18 RX ORDER — LANOLIN ALCOHOL/MO/W.PET/CERES
3 CREAM (GRAM) TOPICAL
COMMUNITY
End: 2019-12-14

## 2019-11-18 ASSESSMENT — ANXIETY QUESTIONNAIRES
3. WORRYING TOO MUCH ABOUT DIFFERENT THINGS: SEVERAL DAYS
IF YOU CHECKED OFF ANY PROBLEMS ON THIS QUESTIONNAIRE, HOW DIFFICULT HAVE THESE PROBLEMS MADE IT FOR YOU TO DO YOUR WORK, TAKE CARE OF THINGS AT HOME, OR GET ALONG WITH OTHER PEOPLE: NOT DIFFICULT AT ALL
6. BECOMING EASILY ANNOYED OR IRRITABLE: SEVERAL DAYS
GAD7 TOTAL SCORE: 3
4. TROUBLE RELAXING: NOT AT ALL
7. FEELING AFRAID AS IF SOMETHING AWFUL MIGHT HAPPEN: NOT AT ALL
5. BEING SO RESTLESS THAT IT IS HARD TO SIT STILL: NOT AT ALL
2. NOT BEING ABLE TO STOP OR CONTROL WORRYING: NOT AT ALL
1. FEELING NERVOUS, ANXIOUS, OR ON EDGE: SEVERAL DAYS

## 2019-11-18 ASSESSMENT — PATIENT HEALTH QUESTIONNAIRE - PHQ9: SUM OF ALL RESPONSES TO PHQ QUESTIONS 1-9: 5

## 2019-11-18 ASSESSMENT — PAIN SCALES - GENERAL: PAINLEVEL: NO PAIN (0)

## 2019-11-18 ASSESSMENT — MIFFLIN-ST. JEOR: SCORE: 2042.66

## 2019-11-18 NOTE — PROGRESS NOTES
Patient:  Lanre Garcia    Date: November 18, 2019    Comprehensive Assessment UPDATE        Comprehensive Summary Update and Review  Counselor met with patient on 11/18/19 and reviewed the Comprehensive Assessment.    There were no changes/updates identified by patient or in chart entries.

## 2019-11-18 NOTE — PROGRESS NOTES
Acknowledgement of Current Treatment Plan - Initial Treatment Plan     INITIAL TREATMENT PLAN:     1. I have participated in creating my treatment plan with my therapist / counselor on __________.     I agree with the plan as it is written in the electronic health record.    Name Signature/Date   Patient     Name of Therapist / Counselor Signature/Date   Counselor/Therapist        2. I have completed and reviewed my Safety Plan with my counselor and signed this on _________. I have been given the hard copy of this plan.    Patient signature/date:      _____________________________________________________________________________    3. Last Use Date: __________    Patient signature/date:     _____________________________________________________________________________

## 2019-11-18 NOTE — PROGRESS NOTES
Comprehensive Assessment Summary     Based on client interview, review of previous assessments and   comprehensive assessment interview the following diagnosis and recommendations are:     Patient: Lanre Garcia  MRN; [id number]   : 1990  Age: 29 year old Sex: male       Client meets criteria for:  303.90/F10.20 Alcohol Use Disorder, Severe;  305.20/F12.10 Cannabis Use Disorder, Mild, in early remission.    Dimension One: Acute Intoxication/Withdrawal Potential     Ratin     (Consider the client's ability to cope with withdrawal symptoms and current state of intoxication)     Pt was positive for Benzodiazepines upon admission to [facility]. Pt was medically stabilized while on our inpatient detox unit and denies any withdrawal symptoms. Pt's TONG date of alcohol: 19; Pt's TONG date of marijuana: May, 2019.    Dimension Two: Biomedical Condition and Complications    Ratin    (Consider the degree to which any physical disorder would interfere with treatment for substance abuse, and the client's ability to tolerate any related discomfort; determine the impact of continued chemical use on the unborn child if the client is pregnant)     Pt has no issues in this area that would prevent him from completing treatment.    Dimension Three: Emotional/Behavioral/Cognitive Conditions & Complications  Ratin   (Determine the degree to which any condition or complications are likely to interfere with treatment for substance abuse or with functioning in significant life areas and the likelihood of risk of harm to self or others)  Pt reports he has a past history of depression and anxiety. Pt denies taking any medications for his mental health symptoms. Pt reports that he had been prescribed them in the past but was mixing them with alcohol and marijuana and reports he had seizures. He lacks insight into how his addiction has affected his mental health. Pt participated in a suicide risk screening during  his CD intake and his risk rating is low/no current risk. Pt will continue to be monitored throughout his treatment process for changes in his risk rating and will complete a Safety Plan Template. Pt denies any past abuse or trauma. He states he grew up in a great house with loving parents. Pt reports that his parents are still . Pt reports that his mother is in recovery and attends AA meetings. Pt states that he has a sister and that they are close. Pt lacks sober coping skills and impulse control.     Dimension Four: Treatment Acceptance/Resistance     Ratin   (Consider the amount of support and encouragement necessary to keep the client involved in treatment)     Pt displays motivation to be in treatment and to learn coping skills. Pt has continued to use despite negative consequences in several areas of his life, including legal, relational, and employment. Pt reports that his family members and his roommates have expressed concern about his drinking Pt lacks insight into how his addiction has affected his life. Pt lacks awareness and insight into the first step. Pt lacks internal motivation to change his life style to maintain sobriety.     Dimension Five: Continued Use/Relaspe Prevention     Ratin  (Consider the degree to which the client's recognizes relapse issues and has the skills to prevent relapse of either substance use or mental health problems)     Pt reports that this is his first tx, although he reports that three or four years ago he did go to therapy for the DWI he received for six months and that it was 1:1 therapy once per week and a group therapy session once per week. Pt reports that he had been smoking marijuana on a daily basis up until about six months ago when he quit to be able to pass a drug test to get a job. Pt reports that he didn't return to smoking marijuana and was just drinking and reports that he didn't like mixing them. Pt reports that his drinking increased when  he quit smoking. Pt reports that he has had other periods in which he has tried to control his use, cut down or quit but has been unsuccessful long-term. Pt endorses cravings. Pt denies ever attending a 12-step sober support group. Pt lacks awareness and insight into his personal relapse process and his triggers and warning signs. Pt lacks coping skills and long-term sober maintenance skills. Pt appears to be at a high risk for relapse at this time.     Dimension Six: Recovery Environment     Rating:   3  (Consider the degree to which key areas of the client's life are supportive of or antagonistic to treatment participation and recovery)     Pt reports that he was working full-time, but that he quit to come to tx since his use was increasing and he realized that he would need hospitalization. Pt reports that outside of working, his time was consumed by drinking. Pt lacked meaningful activity and structure outside of work. Pt denies being in a significant/romantic relationship and denies having any children. Pt reports that he had been living with his parents and that his mother is sober and active in recovery and that his father drinks infrequently. Pt reported that he is unsure what his long-term plans are for his living situation. Pt denies any current legal issues and reports that he had a DWI three or four years. Pt reports that his family and his former roommates are supportive of him. Pt may benefit from a sober living situation following tx. Pt lacks a sober support network.     I have reviewed the information on the assessment, psychosocial and medical history and checklist and the following changes have been made: Dim 6 has been changed from a risk level of a 4 to a 3 since the pt does not have any legal issues and is not homeless.

## 2019-11-18 NOTE — PROGRESS NOTES
Progress Note    This patient had a Rule 25 Assessment on 11/7/2019 completed by AYE Mcduffie, [facility].  This patient was seen for a face to face update of the Rule 25 Assessment on 11/18/2019 by GARTH Ingram.  INSIDE: The patient's Rule 25 Assessment completed on 11/7/2019 is in the patient's electronic medical record in Epic in the Chart Review section under the Notes/Trans Tab.    Alcohol/Drug use since the last CD evaluation (include date of last use):     No additional substances use since the last CD evaluation     Please note any other clinical changes since the last CD evaluation (such as medication changes, additional legal charges, detoxification admissions, overdoses, etc.)     No significant changes since the last CD evaluation       ASAM Dimensions Original scores Current Scores   I.) Intoxication and Withdrawal: 1 1   II.) Biomedical:  0 0   III.) Emotional and Behavioral:  2 2   IV.) Readiness to Change:  0 0   V.) Relapse Potential: 4 4   VI.) Recovery Environmental: 4 4     Please list clinical justifications for the above ASAM score changes since the original comprehensive assessment:     None of the ASAM scores on the six dimensions had changed since the Rule 25 Assessment was completed on 11/7/2019.       Current YAHAIRA: Current UA:     0.000     Positive for Benzodiazepines and negative for all other screened drugs.       PHQ-9, ULYSSES-7   PHQ-9 on 11/18/2019 ULYSSES-7 on 11/18/2019   The patient's PHQ-9 score was 5 out of 27, indicating mild depression.   The patient's ULYSSES-7 score was 3 out of 21, indicating minimal anxiety.       [place]-Suicide Severity Rating Scale Reassessment   Have you ever wished you were dead or that you could go to sleep and not wake up?  Past Month:  No     Have you actually had any thoughts of killing yourself?  Past Month:  No     Have you been thinking about how you might do this?     Past Month:  No   Lifetime:  No   Have you had these thoughts and had  "some intention of acting on them?     Past Month:  No   Lifetime:  No   Have you started to work out the details of how to kill yourself?   Past Month:  No   Lifetime:  No   Do you intend to carry out this plan?   No     When you have the thoughts how long do they last?  The patient denied having any suicidal thoughts within the past month.     Are there things - anyone or anything (i.e. family, Catholic, pain of death) that stopped you from wanting to die or acting on thoughts of suicide?  Does not apply       2008  [employer] [facility] [employer]  Used with permission by Naima Chavarria, PhD.       Guide to C-SSRS Risk Ratings   NO IDEATION:  with no active thoughts IDEATION: with a wish to die. IDEATION: with active thoughts. Risk Ratings   If Yes No No 0 - Very Low Risk   If NA Yes No 1 - Low Risk   If NA Yes Yes 2 - Low/moderate risk   IDEATION: associated thoughts of methods without intent or plan INTENT: Intent to follow through on suicide PLAN: Plan to follow through on suicide Risk Ratings cont...   If Yes No No 3 - Moderate Risk   If Yes Yes No 4 - High Risk   If Yes Yes Yes 5 - High Risk   The patient's ADDITIONAL RISK FACTORS and lack of PROTECTIVE FACTORS may increase their overall suicide risk ratings.     Additional Risk Factors:    A recent loss that was significant to the patient, i.e. loss of job, loss of home, divorce, break-up, etc.   Protective Factors:    Having people in his/her life that would prevent the patient from considering a suicide attempt (i.e. young children, spouse, parents, etc.)     An absence of mental health issues or stable and well treated mental health issues     An absence of chronic health problems or stable and well treated chronic health issues     A positive relationship with his/her clinical medical and/or mental health providers     Risk Status   0. - Very Low Risk:  Evaluation Counselors:  Document in Epic / [employer] to counselor \"Very Low Risk\".      " Treatment Counselors:  Reassess upon admission as applicable, assess weekly in progress notes under Dimension 3 and summarize in Discharge / Treatment summary under Dimension 3.     Additional information to support suicide risk rating: There was no additional information to provide at this time.
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
## 2019-11-18 NOTE — PROGRESS NOTES
"[facility] Health Assessment      Vital signs:     BP (!) 142/97   Pulse 80   Ht 1.88 m (6' 2\")   Wt 99.8 kg (220 lb)   BMI 28.25 kg/m        Direct admission    Counselor: Kasi  Drug of Choice: ETOH  Last use: 11/6/19  Home clinic/MD: [facility], Dr. Kenneth Pallace.   Psychiatrist/therapist: None    Medical history/current conditions:  Seizures and HTN r/t ETOH WD. Pt has been medically detoxed and report this is not an issue currently.     H&P Screen:  H&P within the last 90 days: Yes.  Date: 11/7/19 Location: [facility]    Tobacco use assessment: Do you use tobacco? Denies.     Influenza vaccination: completed while admitted to [facility] 1 week ago.    Mental Health diagnosis: depression and anxiety.  Medication compliant?: Yes  Recent sucidal thoughts? Not in the past 5 years. Denies currently.     When? N/A  Current thought of self-harm? N/A    Plan? N/A    Pain assessment:   Pt. Experiencing pain at this time?  No      Nursing Assessment Summary:  Pt appears medically stable. RN encouraged pt to check BP daily and provided pt with a notebook to keep track of this. RN provided explanation of value of keeping track to verify that he does not have continued HTN now that he is detoxed from ETOH. Pt verbalized understanding. RN also educated on process if he were to have a seizure (code Blue).     On-going nursing intervention required?   No    Acute care visit recommended: no.      "

## 2019-11-18 NOTE — PROGRESS NOTES
Initial Services Plan        Service Initiation Date: 11/18/2019    Immediate health and/or safety concerns: No    Identify health and safety concern(s) below and include plan to address:    None Identified    Treatment suggestions for client during the time between intake (admit date) and completion of the individual treatment plan:     Look for a sober support network, i.e. 12 step, [facility], [facility], etc  Tour the treatment center or outpatient clinic  Introduce yourself to your treatment group. Spend time getting to know your peers  Review your patient or client handbook  Begin working on your treatment goal list    Completed by: GARTH Ingram  Date completed: 11/18/2019 at 9:45 AM

## 2019-11-18 NOTE — PROGRESS NOTES
Name: Lanre Garcia  Date: 11/18/2019  Medical Record: [id number]    Envelope Number: [id number]    List of Contents (List each item separately in new row):     Folic acid 400 mg, centrum silver tablets; balanced B-100 complex    Admission:  I am responsible for any personal items that are not sent to the safe or pharmacy.  [facility] is not responsible for loss, theft or damage of any property in my possession.      Patient Signature:  ___________________________________________       Date/Time:__________________________    Staff Signature: __________________________________       Date/Time:__________________________    2nd Staff person, if patient is unable/unwilling to sign:      __________________________________________________________       Date/Time: __________________________      Discharge:  [facility] has returned all of my personal belongings:    Patient Signature: ________________________________________     Date/Time: ____________________________________    Staff Signature: ______________________________________     Date/Time:_____________________________________

## 2019-11-19 ENCOUNTER — HOSPITAL ENCOUNTER (OUTPATIENT)
Dept: BEHAVIORAL HEALTH | Facility: CLINIC | Age: 29
End: 2019-11-19
Attending: FAMILY MEDICINE
Payer: COMMERCIAL

## 2019-11-19 PROCEDURE — H2035 A/D TX PROGRAM, PER HOUR: HCPCS

## 2019-11-19 PROCEDURE — 10020000 ZZH LODGING PLUS FACILITY CHARGE ADULT

## 2019-11-19 PROCEDURE — H2035 A/D TX PROGRAM, PER HOUR: HCPCS | Mod: HQ

## 2019-11-19 ASSESSMENT — ANXIETY QUESTIONNAIRES: GAD7 TOTAL SCORE: 3

## 2019-11-20 ENCOUNTER — HOSPITAL ENCOUNTER (OUTPATIENT)
Dept: BEHAVIORAL HEALTH | Facility: CLINIC | Age: 29
End: 2019-11-20
Attending: FAMILY MEDICINE
Payer: COMMERCIAL

## 2019-11-20 PROCEDURE — 10020000 ZZH LODGING PLUS FACILITY CHARGE ADULT

## 2019-11-20 PROCEDURE — H2035 A/D TX PROGRAM, PER HOUR: HCPCS | Mod: HQ

## 2019-11-21 ENCOUNTER — HOSPITAL ENCOUNTER (OUTPATIENT)
Dept: BEHAVIORAL HEALTH | Facility: CLINIC | Age: 29
End: 2019-11-21
Attending: FAMILY MEDICINE
Payer: COMMERCIAL

## 2019-11-21 PROCEDURE — H2035 A/D TX PROGRAM, PER HOUR: HCPCS

## 2019-11-21 PROCEDURE — 10020000 ZZH LODGING PLUS FACILITY CHARGE ADULT

## 2019-11-21 PROCEDURE — H2035 A/D TX PROGRAM, PER HOUR: HCPCS | Mod: HQ

## 2019-11-22 ENCOUNTER — HOSPITAL ENCOUNTER (OUTPATIENT)
Dept: BEHAVIORAL HEALTH | Facility: CLINIC | Age: 29
End: 2019-11-22
Attending: FAMILY MEDICINE
Payer: COMMERCIAL

## 2019-11-22 PROCEDURE — H2035 A/D TX PROGRAM, PER HOUR: HCPCS | Mod: HQ

## 2019-11-22 PROCEDURE — 10020000 ZZH LODGING PLUS FACILITY CHARGE ADULT

## 2019-11-22 NOTE — PROGRESS NOTES
Patient met with program  to review and initiate aftercare placement opportunities. Patient presents as a high risk for relapse with limited independent sober living skills. Patient reports that he intends to move in with his parents upon completion of LP program. He presents as highly motivated with a stated desire to continue and strengthen his participation in sober support groups. Outpatient programs at [facility] and [facility] in [place] were reviewed. Patient states that he will discuss these options with his parents when they visit. Referrals on hold until patient indicates his decision.

## 2019-11-23 ENCOUNTER — HOSPITAL ENCOUNTER (OUTPATIENT)
Dept: BEHAVIORAL HEALTH | Facility: CLINIC | Age: 29
End: 2019-11-23
Attending: FAMILY MEDICINE
Payer: COMMERCIAL

## 2019-11-23 PROCEDURE — 10020000 ZZH LODGING PLUS FACILITY CHARGE ADULT

## 2019-11-23 PROCEDURE — H2035 A/D TX PROGRAM, PER HOUR: HCPCS

## 2019-11-24 ENCOUNTER — HOSPITAL ENCOUNTER (OUTPATIENT)
Dept: BEHAVIORAL HEALTH | Facility: CLINIC | Age: 29
End: 2019-11-24
Attending: FAMILY MEDICINE
Payer: COMMERCIAL

## 2019-11-24 PROCEDURE — H2035 A/D TX PROGRAM, PER HOUR: HCPCS

## 2019-11-24 PROCEDURE — 10020000 ZZH LODGING PLUS FACILITY CHARGE ADULT

## 2019-11-24 NOTE — PROGRESS NOTES
"Patient:  Lanre Garcia            Adult CD Progress Note and Treatment Plan Review     Attendance  Please refer to OP BEH CD Adult Attendance Record Documentation Flowsheet    Support group attended this week: yes    Reporting sobriety:  yes    Treatment Plan     Treatment Plan Review competed on: 11-24-19       Client preferred learning style: Visual  Hands on  Verbal    Staff Members contributing GARTH Rodriguez, GARTH Jimenez, MENDEZ Muñoz.                     Received Supervision: no    Client: contributed to goals and plan.    Client received copy of plan/revised plan: Yes    Client agrees with plan/revised plan: Yes        Changes to Treatment Plan: No    New Goals added since last review None needed.    Goals worked on since last review See ASAM notes below.    Strategies effective: yes    Strategies need these changes: None needed.    1) Care Coordination Activities:  Pt.met with program  to go over aftercare options.   2) Medical, Mental Health and other appointments the client attended: None.  3) Medication issues: None.  4) Physical and mental health problems: Pt.reports he has no issues.  5) Any changes in Vulnerable Adult Status?  No If yes, add to treatment plan and individual abuse prevention plan.  6) Review and evaluation of the individual abuse prevention plan: Current IAPP for this program is adequate for this client          ASAM Risk Rating:    Dimension 1 1 No change.    Dimension 2 0 No change.    Dimension 3 2 Pt.reports he has had no suicidal ideation. Pt.is working on his \"Anxiety and Recovery from Chemical Dependency\" assignment. Pt.needs to increase his awareness of how addiction has affected his mental health.    Dimension 4 1 Pt.has been attending all lectures and groups. He completed his first step assignment and presented it in group. The pt. Received positive feedback from his group members. Pt.needs to increase his internal motivation to change " "his life style to maintain sobriety.    Dimension 5 4 Pt.attended the relapse prevention workshop. He was asked to identify his relapse warning signs and triggers in the areas of people,places,activities and feelings. He will continue to work in this area during the treatment process. Pt.is reading \"Mistaken Beliefs About Relapse\" assignment.       Dimension 6 3 pt.met with the program  to talk about aftercare plan. He is attending 5 12 step meetings per week while in treatment. He needs to obtain a sponsor.      Guide to Risk Ratings for Suicidality:   IDEATION: Active thoughts of suicide? INTENT: Intent to follow on suicide? PLAN: Plan to follow through on suicide? Level of Risk:   IF Yes Yes Yes Patient = High Emergent   IF Yes Yes No Patient = High Urgent/Non-Emergent   IF Yes No No Patient = Moderate Non-Urgent   IF No No   No Patient = Low Risk   The patient's ADDITIONAL RISK FACTORS and lack of PROTECTIVE FACTORS may increase their overall suicide risk ratings.     Patient's/client's current risk rating:  Low Risk    Family Involvement:   TERESA signed    Data:   offered feedback good insight client did actively participate      Intervention:   Aftercare planning  Behavior modification  Counselor feedback  Education  Emotional management  Group feedback  Relapse prevention  Twelve Step facilitation  Mental health education      Assessment:   Stages of Change Model  Action    Appears/Sounds:  Cooperative  Motivated  Engaged      Plan:  Continue group therapy.      GARTH Evans        "

## 2019-11-25 ENCOUNTER — HOSPITAL ENCOUNTER (OUTPATIENT)
Dept: BEHAVIORAL HEALTH | Facility: CLINIC | Age: 29
End: 2019-11-25
Attending: FAMILY MEDICINE
Payer: COMMERCIAL

## 2019-11-25 PROCEDURE — H2035 A/D TX PROGRAM, PER HOUR: HCPCS

## 2019-11-25 PROCEDURE — 10020000 ZZH LODGING PLUS FACILITY CHARGE ADULT

## 2019-11-25 PROCEDURE — H2035 A/D TX PROGRAM, PER HOUR: HCPCS | Mod: HQ

## 2019-11-26 ENCOUNTER — HOSPITAL ENCOUNTER (OUTPATIENT)
Dept: BEHAVIORAL HEALTH | Facility: CLINIC | Age: 29
End: 2019-11-26
Attending: FAMILY MEDICINE
Payer: COMMERCIAL

## 2019-11-26 PROCEDURE — 10020000 ZZH LODGING PLUS FACILITY CHARGE ADULT

## 2019-11-26 PROCEDURE — H2035 A/D TX PROGRAM, PER HOUR: HCPCS

## 2019-11-26 PROCEDURE — H2035 A/D TX PROGRAM, PER HOUR: HCPCS | Mod: HQ

## 2019-11-27 ENCOUNTER — HOSPITAL ENCOUNTER (OUTPATIENT)
Dept: BEHAVIORAL HEALTH | Facility: CLINIC | Age: 29
End: 2019-11-27
Attending: FAMILY MEDICINE
Payer: COMMERCIAL

## 2019-11-27 PROCEDURE — 10020000 ZZH LODGING PLUS FACILITY CHARGE ADULT

## 2019-11-27 PROCEDURE — H2035 A/D TX PROGRAM, PER HOUR: HCPCS | Mod: HQ

## 2019-11-28 ENCOUNTER — HOSPITAL ENCOUNTER (OUTPATIENT)
Dept: BEHAVIORAL HEALTH | Facility: CLINIC | Age: 29
End: 2019-11-28
Attending: FAMILY MEDICINE
Payer: COMMERCIAL

## 2019-11-28 PROCEDURE — H2035 A/D TX PROGRAM, PER HOUR: HCPCS

## 2019-11-28 PROCEDURE — 10020000 ZZH LODGING PLUS FACILITY CHARGE ADULT

## 2019-11-29 ENCOUNTER — HOSPITAL ENCOUNTER (OUTPATIENT)
Dept: BEHAVIORAL HEALTH | Facility: CLINIC | Age: 29
End: 2019-11-29
Attending: FAMILY MEDICINE
Payer: COMMERCIAL

## 2019-11-29 PROCEDURE — 10020000 ZZH LODGING PLUS FACILITY CHARGE ADULT

## 2019-11-29 PROCEDURE — H2035 A/D TX PROGRAM, PER HOUR: HCPCS | Mod: HQ

## 2019-11-30 ENCOUNTER — HOSPITAL ENCOUNTER (OUTPATIENT)
Dept: BEHAVIORAL HEALTH | Facility: CLINIC | Age: 29
End: 2019-11-30
Attending: FAMILY MEDICINE
Payer: COMMERCIAL

## 2019-11-30 PROCEDURE — H2035 A/D TX PROGRAM, PER HOUR: HCPCS | Mod: HQ

## 2019-11-30 PROCEDURE — 10020000 ZZH LODGING PLUS FACILITY CHARGE ADULT

## 2019-12-01 ENCOUNTER — HOSPITAL ENCOUNTER (OUTPATIENT)
Dept: BEHAVIORAL HEALTH | Facility: CLINIC | Age: 29
End: 2019-12-01
Attending: FAMILY MEDICINE
Payer: COMMERCIAL

## 2019-12-01 PROCEDURE — H2035 A/D TX PROGRAM, PER HOUR: HCPCS | Mod: HQ

## 2019-12-01 PROCEDURE — 10020000 ZZH LODGING PLUS FACILITY CHARGE ADULT

## 2019-12-01 NOTE — PROGRESS NOTES
"Patient:  Lanre Garcia            Adult CD Progress Note and Treatment Plan Review     Attendance  Please refer to OP BEH CD Adult Attendance Record Documentation Flowsheet    Support group attended this week: Yes    Reporting sobriety:  Yes    Treatment Plan     Treatment Plan Review completed on: 12/1/2019       Client preferred learning style: Visual  Hands on  Verbal    Staff Members contributing: [facility]ardenas [facility]; GARTH Jimenez; Flor Botello [facility]                      Received Supervision: No    Client: Patient contributed to goals and plan.    Client received copy of plan/revised plan: Yes    Client agrees with plan/revised plan: Yes    Changes to Treatment Plan: None    New Goals added since last review: None needed.    Goals worked on since last review: See ASAM notes below.    Strategies effective: Yes    Strategies need these changes: None needed.    1) Care Coordination Activities: Patient has been working with program  and family members to select an aftercare option.  2) Medical, Mental Health and other appointments the client attended: None  3) Medication issues: None  4) Physical and mental health problems: Patient reports no issues.  5) Any changes in Vulnerable Adult Status?  No If yes, add to treatment plan and individual abuse prevention plan.  6) Review and evaluation of the individual abuse prevention plan: Current IAPP for this program is adequate for this client    ASAM Risk Rating:    Dimension 1, 1: No change.    Dimension 2, 0: No change.    Dimension 3, 2: Patient presented his \"Anxiety and Recovery\" assignment in group.  Patient appears to have a better understanding of how his addiction has affected his mental health. Patient denies any suicidal thoughts or ideations at this time. Patient will continue to be monitored throughout treatment.     Dimension 4, 1: Patient has been attending all lectures and groups.  He completed his \"Addictive Disease\" " assignment in group and received positive feedback from group members.  Patient should continue to work on increasing internal motivation to change his lifestyle to maintain sobriety.    Dimension 5, 4: Patient will continue to work on relapse prevention related assignments to develop a relapse prevention plan that will help him maintain sobriety after LP.    Dimension 6, 3: Patient is attending 5 12 step meetings per week while in treatment.  Patient has been working with program  and family members to select an aftercare option.    Guide to Risk Ratings for Suicidality:   IDEATION: Active thoughts of suicide? INTENT: Intent to follow on suicide? PLAN: Plan to follow through on suicide? Level of Risk:   IF Yes Yes Yes Patient = High Emergent   IF Yes Yes No Patient = High Urgent/Non-Emergent   IF Yes No No Patient = Moderate Non-Urgent   IF No No   No Patient = Low Risk   The patient's ADDITIONAL RISK FACTORS and lack of PROTECTIVE FACTORS may increase their overall suicide risk ratings.     Patient's/client's current risk rating:  Low Risk    Family Involvement:   TERESA signed    Data:   offered feedback good insight client did actively participate      Intervention:   Aftercare planning  Behavior modification  Counselor feedback  Education  Emotional management  Group feedback  Relapse prevention  Twelve Step facilitation  Mental health education      Assessment:   Stages of Change Model  Action    Appears/Sounds:  Cooperative  Motivated  Engaged      Plan:  Continue group therapy.      GARTH Rodgers

## 2019-12-01 NOTE — PROGRESS NOTES
Patient states that he has not yet made any decision relative to aftercare arrangements. He states that he will re-visit his options with his parents when they come to visit.

## 2019-12-02 ENCOUNTER — HOSPITAL ENCOUNTER (OUTPATIENT)
Dept: BEHAVIORAL HEALTH | Facility: CLINIC | Age: 29
End: 2019-12-02
Attending: FAMILY MEDICINE
Payer: COMMERCIAL

## 2019-12-02 PROCEDURE — 10020000 ZZH LODGING PLUS FACILITY CHARGE ADULT

## 2019-12-02 PROCEDURE — H2035 A/D TX PROGRAM, PER HOUR: HCPCS | Mod: HQ

## 2019-12-02 PROCEDURE — H2035 A/D TX PROGRAM, PER HOUR: HCPCS

## 2019-12-03 ENCOUNTER — HOSPITAL ENCOUNTER (OUTPATIENT)
Dept: BEHAVIORAL HEALTH | Facility: CLINIC | Age: 29
End: 2019-12-03
Attending: FAMILY MEDICINE
Payer: COMMERCIAL

## 2019-12-03 PROCEDURE — H2035 A/D TX PROGRAM, PER HOUR: HCPCS | Mod: HQ

## 2019-12-03 PROCEDURE — H2035 A/D TX PROGRAM, PER HOUR: HCPCS

## 2019-12-03 PROCEDURE — 10020000 ZZH LODGING PLUS FACILITY CHARGE ADULT

## 2019-12-04 ENCOUNTER — HOSPITAL ENCOUNTER (OUTPATIENT)
Dept: BEHAVIORAL HEALTH | Facility: CLINIC | Age: 29
End: 2019-12-04
Attending: FAMILY MEDICINE
Payer: COMMERCIAL

## 2019-12-04 PROCEDURE — H2035 A/D TX PROGRAM, PER HOUR: HCPCS | Mod: HQ

## 2019-12-04 PROCEDURE — 10020000 ZZH LODGING PLUS FACILITY CHARGE ADULT

## 2019-12-04 NOTE — PROGRESS NOTES
Patient met with Program  to review outpatient options. Required documentation has been forwarded to [facility] program seeking outpatient placement.

## 2019-12-05 ENCOUNTER — HOSPITAL ENCOUNTER (OUTPATIENT)
Dept: BEHAVIORAL HEALTH | Facility: CLINIC | Age: 29
End: 2019-12-05
Attending: FAMILY MEDICINE
Payer: COMMERCIAL

## 2019-12-05 PROCEDURE — H2035 A/D TX PROGRAM, PER HOUR: HCPCS

## 2019-12-05 PROCEDURE — 10020000 ZZH LODGING PLUS FACILITY CHARGE ADULT

## 2019-12-05 PROCEDURE — H2035 A/D TX PROGRAM, PER HOUR: HCPCS | Mod: HQ

## 2019-12-06 ENCOUNTER — HOSPITAL ENCOUNTER (OUTPATIENT)
Dept: BEHAVIORAL HEALTH | Facility: CLINIC | Age: 29
End: 2019-12-06
Attending: FAMILY MEDICINE
Payer: COMMERCIAL

## 2019-12-06 PROCEDURE — H2035 A/D TX PROGRAM, PER HOUR: HCPCS | Mod: HQ

## 2019-12-06 PROCEDURE — 10020000 ZZH LODGING PLUS FACILITY CHARGE ADULT

## 2019-12-07 ENCOUNTER — HOSPITAL ENCOUNTER (OUTPATIENT)
Dept: BEHAVIORAL HEALTH | Facility: CLINIC | Age: 29
End: 2019-12-07
Attending: FAMILY MEDICINE
Payer: COMMERCIAL

## 2019-12-07 PROCEDURE — H2035 A/D TX PROGRAM, PER HOUR: HCPCS | Mod: HQ

## 2019-12-07 PROCEDURE — 10020000 ZZH LODGING PLUS FACILITY CHARGE ADULT

## 2019-12-08 ENCOUNTER — HOSPITAL ENCOUNTER (OUTPATIENT)
Dept: BEHAVIORAL HEALTH | Facility: CLINIC | Age: 29
End: 2019-12-08
Attending: FAMILY MEDICINE
Payer: COMMERCIAL

## 2019-12-08 PROCEDURE — H2035 A/D TX PROGRAM, PER HOUR: HCPCS | Mod: HQ

## 2019-12-08 PROCEDURE — 10020000 ZZH LODGING PLUS FACILITY CHARGE ADULT

## 2019-12-09 ENCOUNTER — HOSPITAL ENCOUNTER (OUTPATIENT)
Dept: BEHAVIORAL HEALTH | Facility: CLINIC | Age: 29
End: 2019-12-09
Attending: FAMILY MEDICINE
Payer: COMMERCIAL

## 2019-12-09 PROCEDURE — H2035 A/D TX PROGRAM, PER HOUR: HCPCS

## 2019-12-09 PROCEDURE — H2035 A/D TX PROGRAM, PER HOUR: HCPCS | Mod: HQ

## 2019-12-09 PROCEDURE — 10020000 ZZH LODGING PLUS FACILITY CHARGE ADULT

## 2019-12-09 NOTE — PROGRESS NOTES
"Patient:  Lanre Garcia            Adult CD Progress Note and Treatment Plan Review     Attendance  Please refer to OP BEH CD Adult Attendance Record Documentation Flowsheet    Support group attended this week: Yes    Reporting sobriety:  Yes    Treatment Plan     Treatment Plan Review completed on: 12/9/19      Client preferred learning style: Visual  Hands on  Verbal    Staff Members contributing: Willie Cardenas [facility]; Rory Gao [facility]; Flor Botello [facility], Daily Washington [facility]                      Received Supervision: No    Client: Patient contributed to goals and plan.    Client received copy of plan/revised plan: Yes    Client agrees with plan/revised plan: Yes    Changes to Treatment Plan: None    New Goals added since last review: None needed.    Goals worked on since last review:  Motivation, Change, Relapse Prevention, Relationship With DOC, Self Esteem, Mental Health, Moment of Truth, Blind Sided by DOC    Strategies effective: Yes    Strategies need these changes: None needed.    1) Care Coordination Activities: Patient will continue to work with counselors and  to develop an aftercare treatment protocol.  2) Medical, Mental Health and other appointments the client attended: None  3) Medication issues: None  4) Physical and mental health problems: Patient reports no issues.  5) Any changes in Vulnerable Adult Status?  No If yes, add to treatment plan and individual abuse prevention plan.  6) Review and evaluation of the individual abuse prevention plan: Current IAPP for this program is adequate for this client    ASAM Risk Rating:    Dimension 1, 1: Patient continues to report no withdrawal symptomology.      Dimension 2, 0: Patient reports no biomedical issues.      Dimension 3, 2: Patient continues to gain insight regarding how his addiction affects his mental health and expresses that awareness with the group setting. Patient completed his \"Mental Health and Addiction\" " "assignment and presented in group. Patient reports no suicidal ideation at this time.      Dimension 4, 1: Patient continues to work on his internal motivation for change. Patient attends all meetings and lectures and is an active member of his group by participating and offering meaningful feedback to his peers. Patient is working on his \"Step Two\" assignment and will present in group when complete.       Dimension 5, 4: Patient continues to work on his triggers, cues and early signs for relapse. Patient is working on his \"Mistaken Beliefs About Relapse\" assignment and will present in group when complete. Patient is also working on his \"Identifying Relapse Triggers and Cues\" assignment and will present in group when complete.      Dimension 6, 3: Patient reports attending 5 12 Step meetings this week according to his treatment plan. Patient will continue to work on obtaining a sponsor while in treatment. Patient will also continue to work with counselors and  to develop an aftercare treatment protocol.    Guide to Risk Ratings for Suicidality:   IDEATION: Active thoughts of suicide? INTENT: Intent to follow on suicide? PLAN: Plan to follow through on suicide? Level of Risk:   IF Yes Yes Yes Patient = High Emergent   IF Yes Yes No Patient = High Urgent/Non-Emergent   IF Yes No No Patient = Moderate Non-Urgent   IF No No   No Patient = Low Risk   The patient's ADDITIONAL RISK FACTORS and lack of PROTECTIVE FACTORS may increase their overall suicide risk ratings.     Patient's/client's current risk rating:  Low Risk    Family Involvement:   TERESA signed    Data:   offered feedback good insight client did actively participate      Intervention:   Aftercare planning  Behavior modification  Counselor feedback  Education  Emotional management  Group feedback  Relapse prevention  Twelve Step facilitation  Mental health education      Assessment:   Stages of Change " Model  Action    Appears/Sounds:  Cooperative  Motivated  Engaged      Plan:  Continue group therapy.      GARTH Muñoz

## 2019-12-10 ENCOUNTER — HOSPITAL ENCOUNTER (OUTPATIENT)
Dept: BEHAVIORAL HEALTH | Facility: CLINIC | Age: 29
End: 2019-12-10
Attending: FAMILY MEDICINE
Payer: COMMERCIAL

## 2019-12-10 PROCEDURE — H2035 A/D TX PROGRAM, PER HOUR: HCPCS | Mod: HQ

## 2019-12-10 PROCEDURE — 10020000 ZZH LODGING PLUS FACILITY CHARGE ADULT

## 2019-12-10 PROCEDURE — H2035 A/D TX PROGRAM, PER HOUR: HCPCS

## 2019-12-11 ENCOUNTER — HOSPITAL ENCOUNTER (OUTPATIENT)
Dept: BEHAVIORAL HEALTH | Facility: CLINIC | Age: 29
End: 2019-12-11
Attending: FAMILY MEDICINE
Payer: COMMERCIAL

## 2019-12-11 PROCEDURE — 10020000 ZZH LODGING PLUS FACILITY CHARGE ADULT

## 2019-12-11 PROCEDURE — H2035 A/D TX PROGRAM, PER HOUR: HCPCS | Mod: HQ

## 2019-12-12 ENCOUNTER — HOSPITAL ENCOUNTER (OUTPATIENT)
Dept: BEHAVIORAL HEALTH | Facility: CLINIC | Age: 29
End: 2019-12-12
Attending: FAMILY MEDICINE
Payer: COMMERCIAL

## 2019-12-12 PROCEDURE — H2035 A/D TX PROGRAM, PER HOUR: HCPCS

## 2019-12-12 PROCEDURE — 10020000 ZZH LODGING PLUS FACILITY CHARGE ADULT

## 2019-12-12 PROCEDURE — H2035 A/D TX PROGRAM, PER HOUR: HCPCS | Mod: HQ

## 2019-12-13 ENCOUNTER — HOSPITAL ENCOUNTER (OUTPATIENT)
Dept: BEHAVIORAL HEALTH | Facility: CLINIC | Age: 29
End: 2019-12-13
Attending: FAMILY MEDICINE
Payer: COMMERCIAL

## 2019-12-13 PROCEDURE — H2035 A/D TX PROGRAM, PER HOUR: HCPCS | Mod: HQ

## 2019-12-13 PROCEDURE — 10020000 ZZH LODGING PLUS FACILITY CHARGE ADULT

## 2019-12-13 NOTE — PROGRESS NOTES
[facility] Discharge Summary/Instructions    Patient:  Lanre Garcia    MRN: [id number]  : 1990 Age: 29 year old Sex: male   -   Focus of Treatment / Discharge Recommendations   Personal Safety/ Management of Symptoms   * Follow your safety plan. Report increased symptoms to your care team and /or go to the nearest Emergency Department.   * Call crisis lines as needed   [facility] [phone] [facility] [phone]   [facility] [phone] Crisis Connection [phone]   [facility] [phone] [facility] [phone]   [facility] [phone] National Suicide Prevention [phone]   [facility] [phone] Suicide Prevention [phone]   [facility] [phone]   Abstinence/Relapse Prevention   * Take all medicines as directed. Carry a current list of medicines with you.   * Use coping skills: Use peer support group,continue to attend 12 step meetings.Obtain and work with sponsor.   * Do not use illicit (street) drugs, controlled substances (narcotics) or alcohol.   Develop/Improve Independent Living/Socialization Skills: Continue to build relationship with family and sober support network.  Community Resources/Supports and Discharge Planning: Attend three 12 step meetings per week.Obtain sponsor.Attend [facility] treatment program  Follow up with psychiatrist / main caregiver: DORIAN Next visit: TBD   Follow up with your therapist: N/A Next visit: N/A   Go to group therapy and / or support groups at: [facility] treatment program and 12 step meetings by your home.   See your medical doctor about: N/A   Other:   Client Signature:_______________________ Date / Time:___________   Staff Signature:________________________ Date / Time:___________

## 2019-12-13 NOTE — PROGRESS NOTES
[address] [place] [address]          Lanre Garcia, 1990, was admitted for evaluation/treatment of chemical dependency at [facility].  This person took part in these program(s):    ______ The Inpatient Program   ______ The Outpatient Program   __X____ The Lodging Plus Program   ______ Lodging Day Outpatient       Date admitted: 11-18-19  Date discharged: 12-16-19    Type of discharge:   ____X__ Satisfactory - completed evaluation / treatment   ______ Discharged without completing   ______ Behavioral discharge   ______ Transferred to another chemical dependency program   ______ Transferred to another type of service   ______ Left against medical advice (AMA) / Eloped       Comments:       Counselor: GARTH Evans                       Date: 12/13/2019             Time: 7:25 AM

## 2019-12-14 ENCOUNTER — HOSPITAL ENCOUNTER (OUTPATIENT)
Dept: BEHAVIORAL HEALTH | Facility: CLINIC | Age: 29
End: 2019-12-14
Attending: FAMILY MEDICINE
Payer: COMMERCIAL

## 2019-12-14 PROCEDURE — 10020000 ZZH LODGING PLUS FACILITY CHARGE ADULT

## 2019-12-14 PROCEDURE — H2035 A/D TX PROGRAM, PER HOUR: HCPCS | Mod: HQ

## 2019-12-15 ENCOUNTER — HOSPITAL ENCOUNTER (OUTPATIENT)
Dept: BEHAVIORAL HEALTH | Facility: CLINIC | Age: 29
End: 2019-12-15
Attending: FAMILY MEDICINE
Payer: COMMERCIAL

## 2019-12-15 PROCEDURE — 10020000 ZZH LODGING PLUS FACILITY CHARGE ADULT

## 2019-12-15 PROCEDURE — H2035 A/D TX PROGRAM, PER HOUR: HCPCS | Mod: HQ

## 2019-12-15 NOTE — PROGRESS NOTES
Nursing Discharge Planning Meeting    Writer completed discharge planning meeting with patient. Discharge is planned for 12/16/19    Discussed appropriate follow up care to manage MARIEL, MI and medical and to obtain medication refills. Patient given a copy of their current medications for reference. Questions were answered at this time and the patient verbalized an understanding of the post-discharge follow up plan.    Patient to schedule an appointment with their PCP [facility], Dr. Kenneth Pallace.   Continue to support patient in discharge planning as needed to assure appropriate continuity of care.     Tobacco Cessation  Patient participated in the nicotine replacement therapy for tobacco cessation or reduction during their treatment programming: No    The patient was provided with community resources for follow-up to continue tobacco cessation support once in the community. Also the patient was encoruaged to discuss their tobacco cessation efforts with the primary care provider.

## 2019-12-16 NOTE — PROGRESS NOTES
"Visit Date:   12/15/2019      EVALUATION COUNSELOR:  AGRTH Bonner.   TREATMENT COUNSELORS:  CHELITA Evans, GARTH and CHELITA Ceron, GARTH.   REFERRAL SOURCE:  [facility].   PROGRAM:  [facility], [place], Lodging Plus Program.      ADMISSION DATE:  11/18/2019.   LAST SESSION DATE:  12/15/2019.      ADMISSION DIAGNOSIS:  Alcohol use disorder, severe, 303.90/F10.20.      DISCHARGE DIAGNOSIS:  Alcohol use disorder, severe, F10.20/303.90.      DISCHARGE STATUS:  The patient successfully completed program with staff approval.      LAST USE DATE:  As client reported, 11/06/2019.      DAYS OF TREATMENT COMPLETED:  Lodging Plus 28 days.      PRESENTING INFORMATION:  The patient was seen at [facility] for his alcoholism.  The patient was experiencing withdrawal symptoms and was wanting help with those.  The doctors had ordered a chemical dependency evaluation and assessment, which was completed.  The patient met the criteria for the Lodging Plus CD treatment program at [facility].  The patient was admitted to the Lodging Plus Treatment Program on 11/18/2019.      SERVICES PROVIDED:  Services included assessment, treatment planning, education regarding chemical dependency, individual and group therapy, spiritual care counseling and workshops dealing with the issues of depression, anxiety, relapse prevention and relationships.      DIMENSION 1:  Acute Intoxication and Withdrawal:  Admission risk factor 0.  Discharge risk factor 0.  The patient had no issues in this area.      DIMENSION 2:  Biomedical:  Admission risk factor 0.  Discharge risk factor 0.  The patient had no issues in this area.      DIMENSION 3:  Emotional Behavioral:  Admission risk factor 2.  Discharge risk factor 1.  Upon entering treatment, the patient reported a history of symptoms of depression and anxiety.  The patient was given the assignment to read \"Anxiety " "and Recovery from Chemical Dependency.\"  The patient was asked to write a 2-page reflection paper on what he learned from this reading.  This allowed the patient to accomplish his goal of understanding the relationship between addiction and his mental health issues.  Upon entering treatment, the patient lacked insight of the effect addiction had on his mental health.  The patient was asked to complete the Mental Health and Addiction assignment, detailing 5 ways his addiction has impacted his mental health and share it in group.  The patient did a nice job on this assignment.  This again allowed the patient to understand how addiction has impacted his mental health.  As part of the CD assessment, the patient participated in a suicide risk screening and was given no or low risk factor.  The patient, once entering the [facility]odging Plus Treatment Program, completed a safety plan lacked all of the patient's do.  The patient stated that at no time during the treatment program did he experience any suicidal ideation.      DIMENSION 4:  Readiness to Change:  Admission risk factor 1.  Discharge risk factor 0.  Upon entering treatment, the patient lacked awareness of the powerlessness related to his addiction.  The patient was asked to complete the first step assignment and share it in group.  This allowed the patient to accomplish his goal of accepting his powerlessness related to his addiction.  Upon entering treatment, the patient lacked understanding of addiction as a disease.  The patient was asked to read Addictive Disease and share a 2-page reflection paper on what he gained by this reading.  This allowed the patient to accomplish his goal of increasing his understanding of addiction as a disease.  Upon entering treatment, the patient lacked awareness of the progressive nature of his addiction and the consequences of his use.  The patient was asked to prepare and present his drug use history, consequences of his use and the " "values that he has violated.  This allowed the patient to accomplish his goal of understanding how addiction and the negative consequences of his using lifestyle have progressed over the years.  Upon entering treatment, the patient had continued use despite serious consequences.  The patient was asked to complete \"What Price am I willing to Pay.  The patient presented this in group.  The patient did an outstanding job on this assignment.  This allowed the patient accomplish his goal of connecting with the consequences and taking ownership of his lifestyle, which would motivate him to change.  The patient stated that upon entering treatment, he was disconnected with his spirituality.  The patient was asked to read 12-step to complete the packet of step 2, finding hope.  This allowed the patient to connect with his spirituality.  The patient also attended spiritual care group on a weekly basis while in treatment.      DIMENSION 5:  Relapse and Continued Use Potential:  Admission risk factor 4.  Discharge risk factor 3.  Upon entering treatment, the patient had mistaken beliefs about recovery and relapse.  The patient was given the assignment to read Mistaken Beliefs About Relapse and write a 2-page reflection paper on what he gained by this reading.  This allowed the patient to accomplish his goal of identifying and discarding mistaken beliefs about the relapse process and long-term recovery.  Upon entering treatment, the patient did not recognize relapse triggers and warning signs.  The patient was asked to complete the worksheet, Identifying Relapse Triggers and Cues.  The patient was able to accomplish his goal of identifying his personal triggers and relapse warning signs in the areas of people, places, activities and feelings.  Upon entering treatment, the patient had a history of self-sabotage after trying to stop drinking numerous times.  The patient was given the assignment to complete step 3, signing to turn " it over assignment.  The patient shared this in group.  The patient accomplished his goal of identifying history of his self-sabotage patterns of the past and committed to new recovery actions in the future.  Upon entering treatment, the patient lacked a relapse prevention plan.  The patient was asked to complete Putting It All Together.  This allowed him to build a relapse prevention plan.  He shared this assignment in group and received positive feedback from his peers.      DIMENSION 6:  Recovery Environment:  Admission risk factor 3.  Discharge risk factor 3.  Upon entering treatment, the patient lacked a sober support network.  The patient was asked to attend five 12-step meetings per week while in treatment.  This allowed him to develop a sober support network.  The patient lacked an aftercare plan.  Upon entering treatment, the patient worked with the staff and .  The patient will be returning home to live with his parents, which is a safe sober living environment.  The patient will be attending outpatient at [facility].      STRENGTHS:  The patient exhibited a positive and open attitude throughout the treatment process.  The patient demonstrated consistent support towards his peers.  The patient gained valuable insight into chemical dependency.      PROGNOSIS:  Favorable.      LIVING ARRANGEMENTS AT DISCHARGE:  The patient will be returning home to live with his parents.  This is a safe sober living situation.      CONTINUING CARE RECOMMENDATIONS AND REFERRALS:   1.  The patient needs to abstain from all mood-altering chemicals.   2.  The patient needs to attend a minimum of three 12-step meetings per week.   3.  The patient needs to obtain a male sponsor and build an open and honest relationship with that sponsor.   4.  The patient needs to complete the [facility].         This information has been disclosed to you from records protected by [facility]  confidentiality rules (42 CFR part 2). The Federal rules prohibit you from making any further disclosure of this information unless further disclosure is expressly permitted by the written consent of the person to whom it pertains or as otherwise permitted by 42 CFR part 2. A general authorization for the release of medical or other information is NOT sufficient for this purpose. The Federal rules restrict any use of the information to criminally investigate or prosecute any alcohol or drug abuse patient.      CHELITA WILSON, [facility]             D: 2019   T: 2019   MT: VASQUEZ      Name:     GREG PEARL   MRN:      [id number]        Account:      [id number]   :      1990           Visit Date:   12/15/2019      Document: [id number]

## 2020-11-02 ENCOUNTER — HOSPITAL ENCOUNTER (EMERGENCY)
Facility: CLINIC | Age: 30
Discharge: OTHER INSTITUTION | End: 2020-11-02
Attending: EMERGENCY MEDICINE | Admitting: EMERGENCY MEDICINE
Payer: COMMERCIAL

## 2020-11-02 VITALS
RESPIRATION RATE: 18 BRPM | OXYGEN SATURATION: 97 % | DIASTOLIC BLOOD PRESSURE: 95 MMHG | SYSTOLIC BLOOD PRESSURE: 137 MMHG | TEMPERATURE: 98.3 F | HEART RATE: 92 BPM

## 2020-11-02 DIAGNOSIS — F10.920 ALCOHOLIC INTOXICATION WITHOUT COMPLICATION (H): ICD-10-CM

## 2020-11-02 LAB
ALBUMIN SERPL-MCNC: 4.1 G/DL (ref 3.4–5)
ALP SERPL-CCNC: 48 U/L (ref 40–150)
ALT SERPL W P-5'-P-CCNC: 49 U/L (ref 0–70)
AMPHETAMINES UR QL SCN: NEGATIVE
ANION GAP SERPL CALCULATED.3IONS-SCNC: 9 MMOL/L (ref 3–14)
AST SERPL W P-5'-P-CCNC: 35 U/L (ref 0–45)
BARBITURATES UR QL: NEGATIVE
BASOPHILS # BLD AUTO: 0.1 10E9/L (ref 0–0.2)
BASOPHILS NFR BLD AUTO: 1.5 %
BENZODIAZ UR QL: NEGATIVE
BILIRUB SERPL-MCNC: 0.7 MG/DL (ref 0.2–1.3)
BUN SERPL-MCNC: 8 MG/DL (ref 7–30)
CALCIUM SERPL-MCNC: 9 MG/DL (ref 8.5–10.1)
CANNABINOIDS UR QL SCN: NEGATIVE
CHLORIDE SERPL-SCNC: 106 MMOL/L (ref 94–109)
CO2 SERPL-SCNC: 24 MMOL/L (ref 20–32)
COCAINE UR QL: NEGATIVE
CREAT SERPL-MCNC: 1.02 MG/DL (ref 0.66–1.25)
DIFFERENTIAL METHOD BLD: NORMAL
EOSINOPHIL # BLD AUTO: 0 10E9/L (ref 0–0.7)
EOSINOPHIL NFR BLD AUTO: 0.6 %
ERYTHROCYTE [DISTWIDTH] IN BLOOD BY AUTOMATED COUNT: 11.9 % (ref 10–15)
ETHANOL SERPL-MCNC: 0.17 G/DL
GFR SERPL CREATININE-BSD FRML MDRD: >90 ML/MIN/{1.73_M2}
GLUCOSE SERPL-MCNC: 99 MG/DL (ref 70–99)
HCT VFR BLD AUTO: 47.4 % (ref 40–53)
HGB BLD-MCNC: 16.7 G/DL (ref 13.3–17.7)
IMM GRANULOCYTES # BLD: 0 10E9/L (ref 0–0.4)
IMM GRANULOCYTES NFR BLD: 0.2 %
LIPASE SERPL-CCNC: 91 U/L (ref 73–393)
LYMPHOCYTES # BLD AUTO: 1.4 10E9/L (ref 0.8–5.3)
LYMPHOCYTES NFR BLD AUTO: 29.6 %
MAGNESIUM SERPL-MCNC: 2.1 MG/DL (ref 1.6–2.3)
MCH RBC QN AUTO: 30.8 PG (ref 26.5–33)
MCHC RBC AUTO-ENTMCNC: 35.2 G/DL (ref 31.5–36.5)
MCV RBC AUTO: 87 FL (ref 78–100)
MONOCYTES # BLD AUTO: 0.3 10E9/L (ref 0–1.3)
MONOCYTES NFR BLD AUTO: 6.1 %
NEUTROPHILS # BLD AUTO: 2.9 10E9/L (ref 1.6–8.3)
NEUTROPHILS NFR BLD AUTO: 62 %
NRBC # BLD AUTO: 0 10*3/UL
NRBC BLD AUTO-RTO: 0 /100
OPIATES UR QL SCN: NEGATIVE
PCP UR QL SCN: NEGATIVE
PLATELET # BLD AUTO: 221 10E9/L (ref 150–450)
POTASSIUM SERPL-SCNC: 3.8 MMOL/L (ref 3.4–5.3)
PROT SERPL-MCNC: 7.6 G/DL (ref 6.8–8.8)
RBC # BLD AUTO: 5.43 10E12/L (ref 4.4–5.9)
SARS-COV-2 RNA SPEC QL NAA+PROBE: NORMAL
SODIUM SERPL-SCNC: 139 MMOL/L (ref 133–144)
SPECIMEN SOURCE: NORMAL
WBC # BLD AUTO: 4.7 10E9/L (ref 4–11)

## 2020-11-02 PROCEDURE — 96375 TX/PRO/DX INJ NEW DRUG ADDON: CPT

## 2020-11-02 PROCEDURE — 83735 ASSAY OF MAGNESIUM: CPT | Performed by: EMERGENCY MEDICINE

## 2020-11-02 PROCEDURE — 96365 THER/PROPH/DIAG IV INF INIT: CPT

## 2020-11-02 PROCEDURE — 96366 THER/PROPH/DIAG IV INF ADDON: CPT

## 2020-11-02 PROCEDURE — 80053 COMPREHEN METABOLIC PANEL: CPT | Performed by: EMERGENCY MEDICINE

## 2020-11-02 PROCEDURE — 258N000003 HC RX IP 258 OP 636: Performed by: EMERGENCY MEDICINE

## 2020-11-02 PROCEDURE — 80320 DRUG SCREEN QUANTALCOHOLS: CPT | Performed by: EMERGENCY MEDICINE

## 2020-11-02 PROCEDURE — U0003 INFECTIOUS AGENT DETECTION BY NUCLEIC ACID (DNA OR RNA); SEVERE ACUTE RESPIRATORY SYNDROME CORONAVIRUS 2 (SARS-COV-2) (CORONAVIRUS DISEASE [COVID-19]), AMPLIFIED PROBE TECHNIQUE, MAKING USE OF HIGH THROUGHPUT TECHNOLOGIES AS DESCRIBED BY CMS-2020-01-R: HCPCS | Performed by: EMERGENCY MEDICINE

## 2020-11-02 PROCEDURE — 93005 ELECTROCARDIOGRAM TRACING: CPT

## 2020-11-02 PROCEDURE — 80307 DRUG TEST PRSMV CHEM ANLYZR: CPT | Performed by: EMERGENCY MEDICINE

## 2020-11-02 PROCEDURE — 250N000011 HC RX IP 250 OP 636: Performed by: EMERGENCY MEDICINE

## 2020-11-02 PROCEDURE — 83690 ASSAY OF LIPASE: CPT | Performed by: EMERGENCY MEDICINE

## 2020-11-02 PROCEDURE — 250N000009 HC RX 250: Performed by: EMERGENCY MEDICINE

## 2020-11-02 PROCEDURE — 85025 COMPLETE CBC W/AUTO DIFF WBC: CPT | Performed by: EMERGENCY MEDICINE

## 2020-11-02 PROCEDURE — 99285 EMERGENCY DEPT VISIT HI MDM: CPT | Mod: 25

## 2020-11-02 PROCEDURE — C9803 HOPD COVID-19 SPEC COLLECT: HCPCS

## 2020-11-02 RX ORDER — ONDANSETRON 2 MG/ML
4 INJECTION INTRAMUSCULAR; INTRAVENOUS ONCE
Status: COMPLETED | OUTPATIENT
Start: 2020-11-02 | End: 2020-11-02

## 2020-11-02 RX ORDER — LORAZEPAM 2 MG/ML
1 INJECTION INTRAMUSCULAR ONCE
Status: COMPLETED | OUTPATIENT
Start: 2020-11-02 | End: 2020-11-02

## 2020-11-02 RX ADMIN — LORAZEPAM 1 MG: 2 INJECTION INTRAMUSCULAR; INTRAVENOUS at 15:21

## 2020-11-02 RX ADMIN — ONDANSETRON 4 MG: 2 INJECTION INTRAMUSCULAR; INTRAVENOUS at 15:21

## 2020-11-02 RX ADMIN — FOLIC ACID: 5 INJECTION, SOLUTION INTRAMUSCULAR; INTRAVENOUS; SUBCUTANEOUS at 14:56

## 2020-11-02 ASSESSMENT — ENCOUNTER SYMPTOMS
DIARRHEA: 0
FEVER: 0
HEADACHES: 1
COUGH: 1
TREMORS: 1
SLEEP DISTURBANCE: 1
NAUSEA: 1
VOMITING: 1

## 2020-11-02 NOTE — ED TRIAGE NOTES
Patient presents to the ED reporting alcohol withdrawal. States is feeling shaky and has a headache. Reports has had seizures in the past with withdrawal.

## 2020-11-02 NOTE — ED NOTES
Bed: ED01  Expected date: 11/2/20  Expected time: 3:18 PM  Means of arrival: Ambulance  Comments:  BV2

## 2020-11-02 NOTE — ED PROVIDER NOTES
History   Chief Complaint:  Alcohol Problem    HPI  Lanre Garcia is a 30 year old male with a history of alcoholism and alcohol withdrawal seizures who presents for evaluation of alcohol withdrawal. He reports he has been drinking a liter of rum every day for the past 5 days, with his last drink at 0200 this morning. He now notes tremors, dull headache, nausea, and vomiting, as well as a mild cough and difficulty sleeping. He states this is similar to his withdrawal in the past including the cough. He denies diarrhea, abdominal pain, dyspnea or fever, and would like to pursue detox. He has been to detox in the past, which has helped. Also denies SI or HI.  Patient denies any drug use.    Allergies:  No Known Allergies    Medications:    The patient is currently on no regular medications.    Past Medical History:    Seizures  Alcoholism & withdrawal  Depressive disorder  Chemical dependency    Past Surgical History:    The patient does not have any pertinent past surgical history.     Family History:    No past pertinent family history.     Social History:  Marital Status: Single  Presents with sister at bedside  Tobacco use: Former smoker  Alcohol use: Yes; daily  Drug use: No    Review of Systems   Constitutional: Negative for fever.   Respiratory: Positive for cough.    Gastrointestinal: Positive for nausea and vomiting. Negative for diarrhea.   Neurological: Positive for tremors and headaches.   Psychiatric/Behavioral: Positive for sleep disturbance. Negative for suicidal ideas (or homicidal ideas).   All other systems reviewed and are negative.    Physical Exam     Patient Vitals for the past 24 hrs:   BP Temp Pulse Resp SpO2   11/02/20 1515 (!) 137/95 -- 92 18 97 %   11/02/20 1512 -- -- 96 20 98 %   11/02/20 1511 -- -- 93 24 98 %   11/02/20 1510 -- -- 90 26 98 %   11/02/20 1509 -- -- 90 22 97 %   11/02/20 1508 -- -- 93 21 93 %   11/02/20 1500 (!) 126/91 -- 92 13 97 %   11/02/20 1448 -- -- 101 23 97 %    11/02/20 1447 (!) 132/96 -- 101 -- --   11/02/20 1411 (!) 153/107 98.3  F (36.8  C) 125 20 95 %       Physical Exam  Nursing note and vitals reviewed.  Constitutional: Well nourished.   Eyes: Conjunctiva normal.  Pupils are equal, round, and reactive to light.   ENT: Nose normal. Mucous membranes pink and moist.    Neck: Normal range of motion.  CVS: Sinus tachycardia.  Normal heart sounds.  No murmur.  Pulmonary: Lungs clear to auscultation bilaterally. No wheezes/rales/rhonchi.  GI: Abdomen soft. Nontender, nondistended. No rigidity or guarding.    MSK: No calf tenderness or swelling.  Neuro: Alert. Follows simple commands.  Mildly tremulous  Skin: Skin is warm and dry. No rash noted.   Psychiatric: Denies any suicidal/homicidal ideations or hallucinations    Emergency Department Course     ECG:  Indication: Withdrawal   Time: 1442  Vent. Rate 105 bpm. MO interval 154. QRS duration 92. QT/QTc 338/446. P-R-T axis 26 9 27.   Sinu tachycardia, Minimal voltage criteria for LVH, may be normal variant, Borderline ECG  Read time: 1442    Laboratory:  Laboratory findings were communicated with the patient who voiced understanding of the findings.    CBC: WBC: 4.7, HGB: 16.7, PLT: 221    CMP: WNL (Creatinine: 1.02)    Lipase: 91    Magnesium: 2.1    Alcohol level blood: 0.17 (H)    Asymptomatic COVID PCR: Pending    Interventions:  1456: NaCl w/ Thiamine, folic acid, and infuvite, IV  1521: Ativan, 1 mg, IV  1521: Zofran, 4 mg, IV     Emergency Department Course:  Past medical records, nursing notes, and vitals reviewed.    1510: I performed an exam of the patient as documented above.     EKG obtained in the ED, see results above.   IV was inserted and blood was drawn for laboratory testing, results above.  The patient provided a urine sample here in the emergency department. This was sent for laboratory testing, findings above.  The patient's nose was swabbed and this sample was sent for COVID testing, findings above.   "    1538: I rechecked the patient and discussed the results of his workup thus far.     I spoke to detox coordinator who has arranged a bed for patient.      Findings and plan explained to the Patient. Patient discharged to detox with instructions regarding supportive care, medications, and reasons to return. The importance of close follow-up was reviewed.     I personally reviewed the laboratory results with the Patient and answered all related questions prior to discharge.     Impression & Plan     Covid-19  Lanre Garcia was evaluated during a global COVID-19 pandemic, which necessitated consideration that the patient might be at risk for infection with the SARS-CoV-2 virus that causes COVID-19.   Applicable protocols for evaluation were followed during the patient's care.   COVID-19 was considered as part of the patient's evaluation. The plan for testing is:  a test was obtained during this visit.    Medical Decision Making:  Patient is a 30-year-old male with known history of alcoholism and alcohol withdrawal seizures presenting for concerns for alcohol withdrawal.  Patient mildly tremulous on arrival.  He was given a dose of IV Ativan with significant improvement in his tremors.  There is no seizure activity or evidence to suggest DTs.  The patient denies any suicidal or homicidal ideations.  Patient is requesting detox today.  He also is noted to be intoxicated today.  His sister is agreeable to drive patient to detox as a bed is available.  Patient reported a number of other symptoms including nausea, vomiting, mild cough though states that these are typical of his \"withdrawal symptoms.\"  I have a lower clinical suspicion for COVID-19 though patient was tested today particularly given he will go to detox.  He has clear lungs, no significant respiratory distress to necessitate chest x-ray as I doubt pneumonia.  Return precautions given.    Diagnosis:    ICD-10-CM    1. Alcoholic intoxication without " complication (H)  F10.920        Disposition:  Discharged to detox.    Scribe Disclosure:  I, Syeda Rasheed, am serving as a scribe at 2:54 PM on 11/2/2020 to document services personally performed by Racquel Carrillo DO based on my observations and the provider's statements to me.         Racquel Carrillo DO  11/02/20 1814

## 2020-11-02 NOTE — ED AVS SNAPSHOT
[facility] Emergency Dept  [address]  [facility] [address]  Phone: [phone]  Fax: [phone]                                    Lanre Garcia   MRN: [id number]    Department: [facility] Emergency Dept   Date of Visit: 11/2/2020           After Visit Summary Signature Page    I have received my discharge instructions, and my questions have been answered. I have discussed any challenges I see with this plan with the nurse or doctor.    ..........................................................................................................................................  Patient/Patient Representative Signature      ..........................................................................................................................................  Patient Representative Print Name and Relationship to Patient    ..................................................               ................................................  Date                                   Time    ..........................................................................................................................................  Reviewed by Signature/Title    ...................................................              ..............................................  Date                                               Time          22EPIC Rev 08/18

## 2020-11-02 NOTE — ED NOTES
Care Management Initial Consult    General Information  Assessment completed with:  ,  no one     Primary Care Provider verified and updated as needed:   no PCP on recore  Readmission within the last 30 days:   no        Advance Care Planning:     none completed today       Communication Assessment  Patient's communication style: spoken language (English or Bilingual)             Cognitive  Cognitive/Neuro/Behavioral:                    in withdrawal during this ED visit    Living Environment:   People in home:     unknown  Current living Arrangements:      unknown  Able to return to prior arrangements:   unknown       Family/Social Support:  Care provided by:   unknown  Provides care for:   unknown  Marital Status: Single             Description of Support System:    unknown       Current Resources:   Skilled Home Care Services:   unknown  Community Resources:   unknown  Equipment currently used at home:   unknown  Supplies currently used at home:   unknown    Employment/Financial:  Employment Status:      unknown     Financial Concerns:   unknown          Lifestyle & Psychosocial Needs:        Socioeconomic History     Marital status: Single     Spouse name: Not on file     Number of children: Not on file     Years of education: Not on file     Highest education level: Not on file     Tobacco Use     Smoking status: Former Smoker     Packs/day: 0.00     Smokeless tobacco: Never Used       Chemical Dependency Status:  Chemical Dependency Status: Current Concern     Pt told ED staff that he is in alcohol withdrawal and would like to go to DETOX.  ED staff requested that I assist with DETOX admission.          Values/Beliefs:  Spiritual, Cultural Beliefs, Orthodoxy Practices, Values that affect care:       unknown          Care Management Discharge Note    Discharge Date: 11/02/20       Discharge Disposition: Inpatient Chemical Dependency    Discharge Services: None    Discharge DME: None    Discharge Transportation:  other (see comments)(family or DETOX transport)    Private pay costs discussed: [facility] Intake staff, Nely, discusses all costs with Pt    PAS Confirmation Code:  NA  Patient/family educated on Medicare website which has current facility and service quality ratings: no    Education Provided on the Discharge Plan:  provided by [facility] Staff, [phone]  Persons Notified of Discharge Plans: none  Patient/Family in Agreement with the Plan: yes    Handoff Referral Completed: No    Nichelle Smith  RN Care Coordinator,  BSN, PHN, CCM, ABDI  Inpatient Care Coordination - Emergency Department  [facility]   [phone]

## 2020-11-03 LAB
INTERPRETATION ECG - MUSE: NORMAL
LABORATORY COMMENT REPORT: NORMAL
SARS-COV-2 RNA SPEC QL NAA+PROBE: NEGATIVE
SPECIMEN SOURCE: NORMAL